# Patient Record
Sex: FEMALE | Race: OTHER | RURAL
[De-identification: names, ages, dates, MRNs, and addresses within clinical notes are randomized per-mention and may not be internally consistent; named-entity substitution may affect disease eponyms.]

---

## 2018-08-07 ENCOUNTER — TELEPHONE (OUTPATIENT)
Dept: FAMILY MEDICINE CLINIC | Age: 38
End: 2018-08-07

## 2018-08-07 NOTE — TELEPHONE ENCOUNTER
Pt is scheduled to come in on 8/13 for an appt with NP. She called wanting to know if NP has received her medical records.     [phone]

## 2018-08-08 NOTE — TELEPHONE ENCOUNTER
Left message for patient to call. We received her medical records today and Rolena  NP has them to review.

## 2018-08-13 ENCOUNTER — OFFICE VISIT (OUTPATIENT)
Dept: FAMILY MEDICINE CLINIC | Age: 38
End: 2018-08-13

## 2018-08-13 VITALS
HEIGHT: 69 IN | OXYGEN SATURATION: 95 % | WEIGHT: 151 LBS | HEART RATE: 84 BPM | RESPIRATION RATE: 16 BRPM | BODY MASS INDEX: 22.36 KG/M2 | DIASTOLIC BLOOD PRESSURE: 60 MMHG | SYSTOLIC BLOOD PRESSURE: 107 MMHG | TEMPERATURE: 98.2 F

## 2018-08-13 DIAGNOSIS — Z76.89 ENCOUNTER TO ESTABLISH CARE: ICD-10-CM

## 2018-08-13 DIAGNOSIS — K58.9 IRRITABLE BOWEL SYNDROME, UNSPECIFIED TYPE: Primary | ICD-10-CM

## 2018-08-13 DIAGNOSIS — L70.9 ACNE, UNSPECIFIED ACNE TYPE: ICD-10-CM

## 2018-08-13 RX ORDER — FLUOXETINE 10 MG/1
10 TABLET ORAL DAILY
Qty: 30 TAB | Refills: 3 | Status: SHIPPED | OUTPATIENT
Start: 2018-08-13 | End: 2019-01-03 | Stop reason: ALTCHOICE

## 2018-08-13 RX ORDER — DESOGESTREL AND ETHINYL ESTRADIOL 0.15-0.03
1 KIT ORAL DAILY
COMMUNITY
Start: 2018-08-13 | End: 2019-01-03 | Stop reason: SDUPTHER

## 2018-08-13 RX ORDER — SPIRONOLACTONE 50 MG/1
50 TABLET, FILM COATED ORAL 2 TIMES DAILY
COMMUNITY
Start: 2018-08-13 | End: 2019-05-02 | Stop reason: SDUPTHER

## 2018-08-13 RX ORDER — FLUOXETINE 10 MG/1
10 TABLET ORAL DAILY
COMMUNITY
Start: 2018-08-13 | End: 2018-08-13 | Stop reason: SDUPTHER

## 2018-08-13 NOTE — MR AVS SNAPSHOT
[address] [address] 
[phone] Patient: Rosa Cartwright MRN: [id number] SPA:[birth year]2 Visit Information Date & Time Provider Department Dept. Phone Encounter #  
 8/13/2018  1:00 PM Randall Loyd  [facility] [phone] [id number] Follow-up Instructions Return in about 4 months (around 12/13/2018) for Routine visit and labs. Upcoming Health Maintenance Date Due DTaP/Tdap/Td series (1 - Tdap) 8/16/2003 PAP AKA CERVICAL CYTOLOGY 8/26/2016 Influenza Age 5 to Adult 8/1/2018 Allergies as of 8/13/2018  Review Complete On: 8/13/2018 By: Randall Loyd NP No Known Allergies Current Immunizations  Never Reviewed No immunizations on file. Not reviewed this visit You Were Diagnosed With   
  
 Codes Comments Irritable bowel syndrome, unspecified type    -  Primary ICD-10-CM: K58.9 ICD-9-CM: 771.0 Vitals BP Pulse Temp Resp Height(growth percentile) Weight(growth percentile) 107/60 (BP 1 Location: Right arm, BP Patient Position: Sitting) 84 98.2 °F (36.8 °C) (Oral) 16 5' 8.5\" (1.74 m) 151 lb (68.5 kg) SpO2 BMI OB Status Smoking Status 95% 22.63 kg/m2 IUD Never Smoker Vitals History BMI and BSA Data Body Mass Index Body Surface Area  
 22.63 kg/m 2 1.82 m 2 Preferred Pharmacy Pharmacy Name Phone [address] [place], [place] - [address] [phone] Your Updated Medication List  
  
   
This list is accurate as of 8/13/18  1:42 PM.  Always use your most recent med list.  
  
  
  
  
 APRI 0.15-0.03 mg Tab Generic drug:  desogestrel-ethinyl estradiol Take 1 Tab by mouth daily. Diphth, Pertus(Acell), Tetanus 2.5-8-5 Lf-mcg-Lf/0.5mL Syrg Commonly known as:  BOOSTRIX TDAP  
0.5 mL by IntraMUSCular route once for 1 dose. FLUoxetine 10 mg tablet Commonly known as:  PROzac Take 1 Tab by mouth daily. spironolactone 50 mg tablet Commonly known as:  ALDACTONE Take 1 Tab by mouth two (2) times a day. Prescriptions Printed Refills Rina Martin,, Tetanus (BOOSTRIX TDAP) 2.5-8-5 Lf-mcg-Lf/0.5mL syrg 0 Si.5 mL by IntraMUSCular route once for 1 dose. Class: Print Route: IntraMUSCular Prescriptions Sent to Pharmacy Refills FLUoxetine (PROZAC) 10 mg tablet 3 Sig: Take 1 Tab by mouth daily. Class: Normal  
 Pharmacy: [address] [place] [address] #: [phone] Route: Oral  
  
Follow-up Instructions Return in about 4 months (around 2018) for Routine visit and labs. Patient Instructions Eating Healthy Foods: Care Instructions Your Care Instructions Eating healthy foods can help lower your risk for disease. Healthy food gives you energy and keeps your heart strong, your brain active, your muscles working, and your bones strong. A healthy diet includes a variety of foods from the basic food groups: grains, vegetables, fruits, milk and milk products, and meat and beans. Some people may eat more of their favorite foods from only one food group and, as a result, miss getting the nutrients they need. So, it is important to pay attention not only to what you eat but also to what you are missing from your diet. You can eat a healthy, balanced diet by making a few small changes. Follow-up care is a key part of your treatment and safety. Be sure to make and go to all appointments, and call your doctor if you are having problems. It's also a good idea to know your test results and keep a list of the medicines you take. How can you care for yourself at home? Look at what you eat · Keep a food diary for a week or two and record everything you eat or drink. Track the number of servings you eat from each food group. · For a balanced diet every day, eat a variety of: ¨ 6 or more ounce-equivalents of grains, such as cereals, breads, crackers, rice, or pasta, every day. An ounce-equivalent is 1 slice of bread, 1 cup of ready-to-eat cereal, or ½ cup of cooked rice, cooked pasta, or cooked cereal. 
¨ 2½ cups of vegetables, especially: § Dark-green vegetables such as broccoli and spinach. § Orange vegetables such as carrots and sweet potatoes. § Dry beans (such as mercer and kidney beans) and peas (such as lentils). ¨ 2 cups of fresh, frozen, or canned fruit. A small apple or 1 banana or orange equals 1 cup. ¨ 3 cups of nonfat or low-fat milk, yogurt, or other milk products. ¨ 5½ ounces of meat and beans, such as chicken, fish, lean meat, beans, nuts, and seeds. One egg, 1 tablespoon of peanut butter, ½ ounce nuts or seeds, or ¼ cup of cooked beans equals 1 ounce of meat. · Learn how to read food labels for serving sizes and ingredients. Fast-food and convenience-food meals often contain few or no fruits or vegetables. Make sure you eat some fruits and vegetables to make the meal more nutritious. · Look at your food diary. For each food group, add up what you have eaten and then divide the total by the number of days. This will give you an idea of how much you are eating from each food group. See if you can find some ways to change your diet to make it more healthy. Start small · Do not try to make dramatic changes to your diet all at once. You might feel that you are missing out on your favorite foods and then be more likely to fail. · Start slowly, and gradually change your habits. Try some of the following: ¨ Use whole wheat bread instead of white bread. ¨ Use nonfat or low-fat milk instead of whole milk. ¨ Eat brown rice instead of white rice, and eat whole wheat pasta instead of white-flour pasta. ¨ Try low-fat cheeses and low-fat yogurt. ¨ Add more fruits and vegetables to meals and have them for snacks. ¨ Add lettuce, tomato, cucumber, and onion to sandwiches. ¨ Add fruit to yogurt and cereal. 
Enjoy food · You can still eat your favorite foods. You just may need to eat less of them. If your favorite foods are high in fat, salt, and sugar, limit how often you eat them, but do not cut them out entirely. · Eat a wide variety of foods. Make healthy choices when eating out · The type of restaurant you choose can help you make healthy choices. Even fast-food chains are now offering more low-fat or healthier choices on the menu. · Choose smaller portions, or take half of your meal home. · When eating out, try: ¨ A veggie pizza with a whole wheat crust or grilled chicken (instead of sausage or pepperoni). ¨ Pasta with roasted vegetables, grilled chicken, or marinara sauce instead of cream sauce. ¨ A vegetable wrap or grilled chicken wrap. ¨ Broiled or poached food instead of fried or breaded items. Make healthy choices easy · Buy packaged, prewashed, ready-to-eat fresh vegetables and fruits, such as baby carrots, salad mixes, and chopped or shredded broccoli and cauliflower. · Buy packaged, presliced fruits, such as melon or pineapple. · Choose 100% fruit or vegetable juice instead of soda. Limit juice intake to 4 to 6 oz (½ to ¾ cup) a day. · Blend low-fat yogurt, fruit juice, and canned or frozen fruit to make a smoothie for breakfast or a snack. Where can you learn more? Go to http://theron-elizabeth.info/. Enter T756 in the search box to learn more about \"Eating Healthy Foods: Care Instructions. \" Current as of: May 12, 2017 Content Version: 11.7 © 6602-6391 [employer]. Care instructions adapted under license by [employer] (which disclaims liability or warranty for this information).  If you have questions about a medical condition or this instruction, always ask your healthcare professional. [address] any warranty or liability for your use of this information. Introducing \[facility]\"" & HEALTH SERVICES! Dear Jose Ramon: Thank you for requesting a Shoutfit account. Our records indicate that you already have an active Shoutfit account. You can access your account anytime at [email or web address]. "[email or web address]/[email or web address] Did you know that you can access your hospital and ER discharge instructions at any time in Shoutfit? You can also review all of your test results from your hospital stay or ER visit. Additional Information If you have questions, please visit the Frequently Asked Questions section of the Shoutfit website at [email or web address]. "[email or web address]/[email or web address]/. Remember, Shoutfit is NOT to be used for urgent needs. For medical emergencies, dial 911. Now available from your iPhone and Android! Please provide this summary of care documentation to your next provider. If you have any questions after today's visit, please call [phone].

## 2018-08-13 NOTE — PROGRESS NOTES
1. Have you been to the ER, urgent care clinic since your last visit? Hospitalized since your last visit? No    2. Have you seen or consulted any other health care providers outside of the [facility] since your last visit? Include any pap smears or colon screening.  No  Reviewed record in preparation for visit and have necessary documentation  Pt did not bring medication to office visit for review    Goals that were addressed and/or need to be completed during or after this appointment include   Health Maintenance Due   Topic Date Due    DTaP/Tdap/Td series (1 - Tdap) 08/16/2003    PAP AKA CERVICAL CYTOLOGY  08/26/2016    Influenza Age 9 to Adult  08/01/2018     Pt has had a PAP in 2017 will request records from GYN

## 2018-08-13 NOTE — PATIENT INSTRUCTIONS

## 2018-08-13 NOTE — PROGRESS NOTES
Gloria Hudson  30 y.o. female  1980  [address]  [address]  <[id number]>     [facility]: Progress Note       Encounter Date: 8/13/2018    No chief complaint on file. History of Present Illness   Gloria Hudson is a 30 y.o. female who presents to clinic today for:  Establish Care. Records rec'v from [facility] and scanned to [facility]. Awaiting gyn notes from Dr. Karuna Nunez. Dr. Karuna Nunez prescribes the Apri and aldactone. Patient with no CC at this time. Endorses eating a healthy diet; occasionally adds salt to foods & will eat fast foods depending on schedule. Hydration with water mainly, but also drinks tea. Patient states her IBS was causing anxiety and and social isolation. Used Bental and Linzess in the past with some relief. Patient states it would starts as constipation then change to diarrhea. She states the low dose Prozac is working. She is  with 3 children (14yo boy, 17yo boy, 10yo girl) . Denies domestic abuse. Works for the [employer]. Health Maintenance    Health Maintenance Due   Topic Date Due    DTaP/Tdap/Td series (1 - Tdap) 08/16/2003    PAP AKA CERVICAL CYTOLOGY  08/26/2016    Influenza Age 5 to Adult  08/01/2018     Review of Systems   Review of Systems   Constitutional: Negative. HENT: Negative. Eyes: Negative. Respiratory: Negative. Cardiovascular: Negative. Gastrointestinal: Negative. Genitourinary: Negative. Musculoskeletal: Negative. Skin: Negative. Neurological: Negative. Endo/Heme/Allergies: Negative. Psychiatric/Behavioral: Negative. Vitals/Objective:     Vitals:    08/13/18 1307   BP: 107/60   Pulse: 84   Resp: 16   Temp: 98.2 °F (36.8 °C)   TempSrc: Oral   SpO2: 95%   Weight: 151 lb (68.5 kg)   Height: 5' 8.5\" (1.74 m)     Body mass index is 22.63 kg/(m^2). Physical Exam   Constitutional: She is oriented to person, place, and time.  She appears well-developed and well-nourished. She is cooperative. HENT:   Head: Normocephalic. Nose: Nose normal.   Mouth/Throat: Uvula is midline, oropharynx is clear and moist and mucous membranes are normal.   Eyes: Conjunctivae and lids are normal. Pupils are equal, round, and reactive to light. Neck: Trachea normal, normal range of motion, full passive range of motion without pain and phonation normal. Neck supple. No thyroid mass and no thyromegaly present. Cardiovascular: Normal rate, regular rhythm, normal heart sounds and normal pulses. Pulmonary/Chest: Effort normal and breath sounds normal.   Abdominal: Soft. Normal appearance and bowel sounds are normal. There is no hepatosplenomegaly. There is no tenderness. There is no CVA tenderness. Musculoskeletal: Normal range of motion. Lymphadenopathy:     She has no cervical adenopathy. Neurological: She is alert and oriented to person, place, and time. She has normal strength. No cranial nerve deficit. She displays a negative Romberg sign. CN II-XII intact. Skin: Skin is warm, dry and intact. Psychiatric: She has a normal mood and affect. Her speech is normal and behavior is normal. Judgment and thought content normal. Cognition and memory are normal.       No results found for this or any previous visit (from the past 24 hour(s)). Assessment and Plan:   1. Irritable bowel syndrome, unspecified type    - FLUoxetine (PROZAC) 10 mg tablet; Take 1 Tab by mouth daily. Dispense: 30 Tab; Refill: 3    Discussed f/u in ~4 months for routine visit and labs. I have discussed the diagnosis with the patient and the intended plan as seen in the above orders. she has expressed understanding. The patient has received an after-visit summary and questions were answered concerning future plans. I have discussed medication side effects and warnings with the patient as well.     Electronically Signed: Marli Muller NP     History/Allergies   Patients past medical, surgical and family histories were reviewed and updated. Past Medical History:   Diagnosis Date    Acne       Past Surgical History:   Procedure Laterality Date    HX TUBAL LIGATION  08/01/2016     No family history on file. Social History     Social History    Marital status:      Spouse name: N/A    Number of children: N/A    Years of education: N/A     Occupational History    Not on file. Social History Main Topics    Smoking status: Never Smoker    Smokeless tobacco: Never Used    Alcohol use No    Drug use: No    Sexual activity: Yes     Partners: Male     Birth control/ protection: Injection, Surgical     Other Topics Concern    Not on file     Social History Narrative         No Known Allergies    Disposition     Follow-up Disposition:  Return in about 4 months (around 12/13/2018) for Routine visit and labs. No future appointments. Current Medications after this visit     Current Outpatient Prescriptions   Medication Sig    spironolactone (ALDACTONE) 50 mg tablet Take 1 Tab by mouth two (2) times a day.  desogestrel-ethinyl estradiol (APRI) 0.15-0.03 mg tab Take 1 Tab by mouth daily.  Diphth, Pertus,Acell,, Tetanus (BOOSTRIX TDAP) 2.5-8-5 Lf-mcg-Lf/0.5mL syrg 0.5 mL by IntraMUSCular route once for 1 dose.  FLUoxetine (PROZAC) 10 mg tablet Take 1 Tab by mouth daily. No current facility-administered medications for this visit.       Medications Discontinued During This Encounter   Medication Reason    betamethasone valerate (VALISONE) 0.1 % ointment Therapy Completed    FLUoxetine (PROZAC) 10 mg tablet Reorder

## 2018-12-20 ENCOUNTER — OFFICE VISIT (OUTPATIENT)
Dept: FAMILY MEDICINE CLINIC | Age: 38
End: 2018-12-20

## 2018-12-20 VITALS
HEART RATE: 72 BPM | TEMPERATURE: 97.5 F | BODY MASS INDEX: 22.22 KG/M2 | RESPIRATION RATE: 20 BRPM | DIASTOLIC BLOOD PRESSURE: 69 MMHG | OXYGEN SATURATION: 99 % | WEIGHT: 150 LBS | HEIGHT: 69 IN | SYSTOLIC BLOOD PRESSURE: 104 MMHG

## 2018-12-20 DIAGNOSIS — R10.9 ABDOMINAL PAIN, UNSPECIFIED ABDOMINAL LOCATION: Primary | ICD-10-CM

## 2018-12-20 DIAGNOSIS — K59.1 FUNCTIONAL DIARRHEA: ICD-10-CM

## 2018-12-20 DIAGNOSIS — R19.7 DIARRHEA, UNSPECIFIED TYPE: ICD-10-CM

## 2018-12-20 RX ORDER — DICYCLOMINE HYDROCHLORIDE 10 MG/1
40 CAPSULE ORAL 4 TIMES DAILY
Qty: 28 CAP | Refills: 0 | Status: SHIPPED | OUTPATIENT
Start: 2018-12-20 | End: 2019-01-03 | Stop reason: DRUGHIGH

## 2018-12-20 RX ORDER — DICYCLOMINE HYDROCHLORIDE 10 MG/1
20 CAPSULE ORAL 4 TIMES DAILY
Qty: 28 CAP | Refills: 0 | Status: SHIPPED | OUTPATIENT
Start: 2018-12-20 | End: 2019-01-03 | Stop reason: DRUGHIGH

## 2018-12-20 NOTE — PATIENT INSTRUCTIONS
Diet for Irritable Bowel Syndrome: Care Instructions  Your Care Instructions    Irritable bowel syndrome, or IBS, is a problem with the intestines. IBS can cause belly pain, bloating, gas, constipation, and diarrhea. Most people can control their symptoms by changing their diet and easing stress. No specific foods cause everyone with IBS to have symptoms. Doctors don't offer a specific diet to manage symptoms. But many people find that they feel better when they stop eating certain foods. A high-fiber diet may help if you have constipation. Follow-up care is a key part of your treatment and safety. Be sure to make and go to all appointments, and call your doctor if you are having problems. It's also a good idea to know your test results and keep a list of the medicines you take. How can you care for yourself at home? To reduce constipation  · Include fruits, vegetables, beans, and whole grains in your diet each day. These foods are high in fiber. Slowly increase the amount of fiber you eat. This helps you avoid a lot of gas. · Drink plenty of fluids, enough so that your urine is light yellow or clear like water. If you have kidney, heart, or liver disease and have to limit fluids, talk with your doctor before you increase the amount of fluids you drink. · Get some exercise every day. Build up slowly to 30 to 60 minutes a day on 5 or more days of the week. · Take a fiber supplement, such as Citrucel or Metamucil, every day if needed. Read and follow all instructions on the label. · Schedule time each day for a bowel movement. Having a daily routine may help. Take your time and do not strain when having a bowel movement. · Check with your doctor before you increase the amount of fiber in your diet. For some people who have IBS, eating more fiber may make some symptoms worse. This includes bloating. To reduce diarrhea  You may try giving up foods or drinks one at a time to see whether symptoms improve. Limit or avoid the following:  · Alcohol  · Caffeine, which is found in coffee, tea, cola drinks, and chocolate  · Nicotine, from smoking or chewing tobacco  · Gas-producing foods, such as beans, broccoli, cabbage, and apples  · Dairy products that contain lactose (milk sugar), such as ice cream, milk, cheese, and sour cream  · Foods and drinks high in sugar, especially fruit juice, soda, candy, and other packaged sweets (such as cookies)  · Foods high in fat, including garcia, sausage, butter, oils, and anything deep-fried  · Sorbitol and xylitol, artificial sweeteners found in some sugarless candies and chewing gum  Keep track of foods  · Some people with IBS use a daily food diary to keep track of what they eat and whether they have any symptoms after eating certain foods. The diary also can be a good way to record what is going on in your life. · Stress plays a role in IBS. So if you are aware that certain stresses bring on symptoms, you can try to reduce those stresses. Keep mealtimes pleasant  · Try to maintain a pleasant environment when you eat. This may reduce stress that can make symptoms likely to occur. · Give yourself plenty of time to eat, rather than eating on the go. Chew your food slowly. Try not to swallow air, which can cause bloating. Where can you learn more? Go to http://theron-elizabeth.info/. Enter D123 in the search box to learn more about \"Diet for Irritable Bowel Syndrome: Care Instructions. \"  Current as of: March 29, 2018  Content Version: 11.8  © 4491-8573 [employer]. Care instructions adapted under license by [employer] (which disclaims liability or warranty for this information). If you have questions about a medical condition or this instruction, always ask your healthcare professional. [address] any warranty or liability for your use of this information.

## 2018-12-20 NOTE — PROGRESS NOTES
Chief Complaint   Patient presents with    Abdominal Pain     Visit Vitals  /69 (BP 1 Location: Right arm, BP Patient Position: Sitting)   Pulse 72   Temp 97.5 °F (36.4 °C) (Oral)   Resp 20   Ht 5' 8.5\" (1.74 m)   Wt 150 lb (68 kg)   SpO2 99%   BMI 22.48 kg/m²     1. Have you been to the ER, urgent care clinic since your last visit? Hospitalized since your last visit? No    2. Have you seen or consulted any other health care providers outside of the [address] [place] [address] since your last visit? Include any pap smears or colon screening.  No    Reviewed record in preparation for visit and have necessary documentation  Pt did not bring medication to office visit for review  opportunity was given for questions  Goals that were addressed and/or need to be completed during or after this appointment include   Health Maintenance Due   Topic Date Due    DTaP/Tdap/Td series (1 - Tdap) 08/16/2003    Influenza Age 5 to Adult  08/01/2018     Patient refused flu shot

## 2018-12-20 NOTE — PROGRESS NOTES
Veda Lucio  41 y.o. female  1980  [address]   [address]  [id number]     [facility]: Progress Note       Encounter Date: 12/20/2018    Chief Complaint   Patient presents with    Abdominal Pain     History of Present Illness   Veda Lucio is a 41 y.o. female who presents to clinic today for:    Abdominal Pain-Feels like her symptoms are getting worse over the past month-diarrhea, dull abdominal pain (2/10) and (5/10 during a flare). She states she has always had bowel issues even as a child. She states that the flares are interfering with her work and social life. She states she is not eating when she knows she has work meetings for fear that she will have to go to the bathroom. She was in route to [place] with her  and had to turn around and go home due to panicking about having diarrhea. She states she plans outings based on bathroom availability. Pervious provider suggested a SSRI Prozac 10mg for ? IBS. She does not think she had any testing for her abdominal symptoms. She states she has been on Prozac ~1 year. She is unsure if the Prozac really work or if it \"was all in my head\". Denies-constipation (in the past had constipation and only stooling every couple of weeks), mucus, blood, early satiety, GERD, N, V, chest pain, incontinence of stool or bladder, nocturnal abdominal pain, weight loss, anemia or feeling fatigue. She does report that she was told that she had DJD in her lower back and when she has a flare her back will hurt more. Yesterday she states she has water, peppermint tea and crackers because she knew she had a meeting at work; no BM while at work; when she got home she tolerated a grilled cheese and soup. She states she will use a heating pad on her abdomen at night. She denies travel out of the country and no new food. She states she has a limited variety of foods. Denies trauma; tubal ligation 2016.  She takes her BCP everyday in order to not have a menstrual cycle. Health Maintenance    Health Maintenance Due   Topic Date Due    DTaP/Tdap/Td series (1 - Tdap) 08/16/2003     Review of Systems   Review of Systems   Constitutional: Negative. HENT: Negative. Eyes: Negative. Respiratory: Negative. Cardiovascular: Negative. Gastrointestinal: Positive for abdominal pain and diarrhea. Genitourinary: Negative. Musculoskeletal: Negative. Skin: Negative. Neurological: Negative. Endo/Heme/Allergies: Negative. Psychiatric/Behavioral: Negative. Vitals/Objective:     Vitals:    12/20/18 1337   BP: 104/69   Pulse: 72   Resp: 20   Temp: 97.5 °F (36.4 °C)   TempSrc: Oral   SpO2: 99%   Weight: 150 lb (68 kg)   Height: 5' 8.5\" (1.74 m)     Body mass index is 22.48 kg/m². Physical Exam   Constitutional: She is oriented to person, place, and time. She is cooperative. Eyes: Conjunctivae and lids are normal.   Cardiovascular: Normal rate, regular rhythm, normal heart sounds and normal pulses. Pulmonary/Chest: Effort normal and breath sounds normal.   Abdominal: Soft. Normal appearance and bowel sounds are normal. She exhibits no mass. There is no hepatosplenomegaly. There is no tenderness. There is no rigidity and no guarding. Musculoskeletal: Normal range of motion. Neurological: She is alert and oriented to person, place, and time. Skin: Skin is warm, dry and intact. Psychiatric: She has a normal mood and affect. Her speech is normal and behavior is normal. Judgment and thought content normal. Cognition and memory are normal.   Denies stressors with home or work. No results found for this or any previous visit (from the past 24 hour(s)). Assessment and Plan:   1. Abdominal pain, unspecified abdominal location    - CBC WITH AUTOMATED DIFF  - METABOLIC PANEL, COMPREHENSIVE  - C REACTIVE PROTEIN, QT  - TISSUE TRANSGLUT. AB, IGG  - ALPHA-GAL FOOD PANEL  - dicyclomine (BENTYL) 10 mg capsule;  Take 2 Caps by mouth four (4) times daily. Dispense: 28 Cap; Refill: 0  - dicyclomine (BENTYL) 10 mg capsule; Take 4 Caps by mouth four (4) times daily. Dispense: 28 Cap; Refill: 0    2. Functional diarrhea    - CBC WITH AUTOMATED DIFF  - METABOLIC PANEL, COMPREHENSIVE  - C REACTIVE PROTEIN, QT  - TISSUE TRANSGLUT. AB, IGG  - ALPHA-GAL FOOD PANEL    3. Diarrhea, unspecified type    - dicyclomine (BENTYL) 10 mg capsule; Take 2 Caps by mouth four (4) times daily. Dispense: 28 Cap; Refill: 0  - dicyclomine (BENTYL) 10 mg capsule; Take 4 Caps by mouth four (4) times daily. Dispense: 28 Cap; Refill: 0    Awaiting labs to r/o infection, celiac, alpha gal and anemia-will prescribe bentyl 20mg 4 times a day for 7 days then increase dose to 40mg 4 times a day. If not better discussed GI referral. Will f/u with patient in 2 weeks. Discussed Prozac taper over the next 30 days. One every other day for a week, every 2 days, every 3 days and then d/c. Discussed s/s of serotonin syndrome and the importance of tapering off the medication. I have discussed the diagnosis with the patient and the intended plan as seen in the above orders. she has expressed understanding. The patient has received an after-visit summary and questions were answered concerning future plans. I have discussed medication side effects and warnings with the patient as well. Electronically Signed: Onel Givens NP     History/Allergies   Patients past medical, surgical and family histories were reviewed and updated. Past Medical History:   Diagnosis Date    Acne       Past Surgical History:   Procedure Laterality Date    HX TUBAL LIGATION  08/01/2016     No family history on file.   Social History     Socioeconomic History    Marital status:      Spouse name: Not on file    Number of children: Not on file    Years of education: Not on file    Highest education level: Not on file   Social Needs    Financial resource strain: Not on file    Food insecurity - worry: Not on file    Food insecurity - inability: Not on file    Transportation needs - medical: Not on file   [facility] needs - non-medical: Not on file   Occupational History    Not on file   Tobacco Use    Smoking status: Never Smoker    Smokeless tobacco: Never Used   Substance and Sexual Activity    Alcohol use: No    Drug use: No    Sexual activity: Yes     Partners: Male     Birth control/protection: Injection, Surgical   Other Topics Concern    Not on file   Social History Narrative    Not on file         No Known Allergies    Disposition     Follow-up Disposition:  Return in about 2 weeks (around 1/3/2019), or if symptoms worsen or fail to improve, for medication f/u. Future Appointments   Date Time Provider Carlos Lindo   1/3/2019  2:10 PM Raul Shafer, [facility] SCHED            Current Medications after this visit     Current Outpatient Medications   Medication Sig    dicyclomine (BENTYL) 10 mg capsule Take 2 Caps by mouth four (4) times daily.  dicyclomine (BENTYL) 10 mg capsule Take 4 Caps by mouth four (4) times daily.  spironolactone (ALDACTONE) 50 mg tablet Take 1 Tab by mouth two (2) times a day.  desogestrel-ethinyl estradiol (APRI) 0.15-0.03 mg tab Take 1 Tab by mouth daily.  FLUoxetine (PROZAC) 10 mg tablet Take 1 Tab by mouth daily. No current facility-administered medications for this visit. There are no discontinued medications.

## 2018-12-21 ENCOUNTER — TELEPHONE (OUTPATIENT)
Dept: FAMILY MEDICINE CLINIC | Age: 38
End: 2018-12-21

## 2018-12-21 NOTE — TELEPHONE ENCOUNTER
Received call from patient, informed per NP:  Please inform patient that her CBC (no infection or aneima), CMP (kidney, liver and electrolytes) and CRP (no infection or inflammation) are normal. Awaiting her labs on the meat allergy and celiac to result.      I hope the bentyl is working. Patient verbalized understanding.

## 2018-12-21 NOTE — TELEPHONE ENCOUNTER
Please inform patient that her CBC (no infection or aneima), CMP (kidney, liver and electrolytes) and CRP (no infection or inflammation) are normal. Awaiting her labs on the meat allergy and celiac to result. I hope the bentyl is working. Thank you!

## 2018-12-26 LAB
ALBUMIN SERPL-MCNC: 4.5 G/DL (ref 3.5–5.5)
ALBUMIN/GLOB SERPL: 1.5 {RATIO} (ref 1.2–2.2)
ALP SERPL-CCNC: 61 IU/L (ref 39–117)
ALPHA-GAL IGE QN: <0.1 KU/L
ALT SERPL-CCNC: 10 IU/L (ref 0–32)
AST SERPL-CCNC: 16 IU/L (ref 0–40)
BASOPHILS # BLD AUTO: 0 X10E3/UL (ref 0–0.2)
BASOPHILS NFR BLD AUTO: 0 %
BEEF IGE QN: <0.1 KU/L
BILIRUB SERPL-MCNC: 0.4 MG/DL (ref 0–1.2)
BUN SERPL-MCNC: 11 MG/DL (ref 6–20)
BUN/CREAT SERPL: 14 (ref 9–23)
CALCIUM SERPL-MCNC: 10 MG/DL (ref 8.7–10.2)
CHLORIDE SERPL-SCNC: 101 MMOL/L (ref 96–106)
CO2 SERPL-SCNC: 23 MMOL/L (ref 20–29)
CREAT SERPL-MCNC: 0.81 MG/DL (ref 0.57–1)
CRP SERPL-MCNC: 1.3 MG/L (ref 0–4.9)
DEPRECATED BEEF IGE RAST QL: 0
DEPRECATED LAMB IGE RAST QL: 0
DEPRECATED PORK IGE RAST QL: 0
EOSINOPHIL # BLD AUTO: 0.2 X10E3/UL (ref 0–0.4)
EOSINOPHIL NFR BLD AUTO: 4 %
ERYTHROCYTE [DISTWIDTH] IN BLOOD BY AUTOMATED COUNT: 13.3 % (ref 12.3–15.4)
GLOBULIN SER CALC-MCNC: 3 G/DL (ref 1.5–4.5)
GLUCOSE SERPL-MCNC: 74 MG/DL (ref 65–99)
HCT VFR BLD AUTO: 37.3 % (ref 34–46.6)
HGB BLD-MCNC: 12.6 G/DL (ref 11.1–15.9)
IMM GRANULOCYTES # BLD: 0 X10E3/UL (ref 0–0.1)
IMM GRANULOCYTES NFR BLD: 0 %
LAMB IGE QN: <0.1 KU/L
LYMPHOCYTES # BLD AUTO: 2.5 X10E3/UL (ref 0.7–3.1)
LYMPHOCYTES NFR BLD AUTO: 41 %
MCH RBC QN AUTO: 31.2 PG (ref 26.6–33)
MCHC RBC AUTO-ENTMCNC: 33.8 G/DL (ref 31.5–35.7)
MCV RBC AUTO: 92 FL (ref 79–97)
MONOCYTES # BLD AUTO: 0.3 X10E3/UL (ref 0.1–0.9)
MONOCYTES NFR BLD AUTO: 5 %
NEUTROPHILS # BLD AUTO: 3.1 X10E3/UL (ref 1.4–7)
NEUTROPHILS NFR BLD AUTO: 50 %
PLATELET # BLD AUTO: 234 X10E3/UL (ref 150–379)
PORK IGE QN: <0.1 KU/L
POTASSIUM SERPL-SCNC: 4 MMOL/L (ref 3.5–5.2)
PROT SERPL-MCNC: 7.5 G/DL (ref 6–8.5)
RBC # BLD AUTO: 4.04 X10E6/UL (ref 3.77–5.28)
SODIUM SERPL-SCNC: 140 MMOL/L (ref 134–144)
TTG IGG SER-ACNC: <2 U/ML (ref 0–5)
WBC # BLD AUTO: 6.2 X10E3/UL (ref 3.4–10.8)

## 2018-12-27 ENCOUNTER — TELEPHONE (OUTPATIENT)
Dept: FAMILY MEDICINE CLINIC | Age: 38
End: 2018-12-27

## 2018-12-27 NOTE — TELEPHONE ENCOUNTER
Spoke with patient and verified identifiers. Patient is on my chart and is able to review her labs. Discussed briefly lab values; will discuss in more detail at f/u appointment. Patient states she is feeling better with the bentyl and no reported side effects.

## 2019-01-03 ENCOUNTER — OFFICE VISIT (OUTPATIENT)
Dept: FAMILY MEDICINE CLINIC | Age: 39
End: 2019-01-03

## 2019-01-03 VITALS
RESPIRATION RATE: 18 BRPM | WEIGHT: 153.8 LBS | OXYGEN SATURATION: 98 % | TEMPERATURE: 97.5 F | SYSTOLIC BLOOD PRESSURE: 104 MMHG | HEART RATE: 63 BPM | DIASTOLIC BLOOD PRESSURE: 66 MMHG | HEIGHT: 69 IN | BODY MASS INDEX: 22.78 KG/M2

## 2019-01-03 DIAGNOSIS — K58.2 IRRITABLE BOWEL SYNDROME WITH BOTH CONSTIPATION AND DIARRHEA: Primary | ICD-10-CM

## 2019-01-03 DIAGNOSIS — R19.7 DIARRHEA, UNSPECIFIED TYPE: ICD-10-CM

## 2019-01-03 DIAGNOSIS — Z79.899 MEDICATION MANAGEMENT: ICD-10-CM

## 2019-01-03 DIAGNOSIS — R10.9 ABDOMINAL PAIN, UNSPECIFIED ABDOMINAL LOCATION: ICD-10-CM

## 2019-01-03 RX ORDER — DICYCLOMINE HYDROCHLORIDE 20 MG/1
40 TABLET ORAL EVERY 6 HOURS
Qty: 240 TAB | Refills: 3 | Status: SHIPPED | OUTPATIENT
Start: 2019-01-03 | End: 2019-08-07 | Stop reason: SDUPTHER

## 2019-01-03 RX ORDER — DESOGESTREL AND ETHINYL ESTRADIOL 0.15-0.03
1 KIT ORAL DAILY
Qty: 1 DOSE PACK | Refills: 11 | Status: SHIPPED | OUTPATIENT
Start: 2019-01-03 | End: 2019-05-02 | Stop reason: SDUPTHER

## 2019-01-03 NOTE — PROGRESS NOTES
Chief Complaint   Patient presents with    Medication Refill     Visit Vitals  /66 (BP 1 Location: Right arm, BP Patient Position: Sitting)   Pulse 63   Temp 97.5 °F (36.4 °C) (Oral)   Resp 18   Ht 5' 8.5\" (1.74 m)   Wt 153 lb 12.8 oz (69.8 kg)   SpO2 98%   BMI 23.05 kg/m²     1. Have you been to the ER, urgent care clinic since your last visit? Hospitalized since your last visit? No    2. Have you seen or consulted any other health care providers outside of the [address] [place] [address] since your last visit? Include any pap smears or colon screening.  No    Reviewed record in preparation for visit and have necessary documentation  Pt did not bring medication to office visit for review  opportunity was given for questions  Goals that were addressed and/or need to be completed during or after this appointment include   Health Maintenance Due   Topic Date Due    DTaP/Tdap/Td series (1 - Tdap) 08/16/2003

## 2019-01-03 NOTE — PROGRESS NOTES
Dane Ferreira  41 y.o. female  1980  [address]  [id number]     [facility] Practice: Progress Note       Encounter Date: 1/3/2019    Chief Complaint   Patient presents with    Medication Refill     History of Present Illness   Dane Ferreira is a 41 y.o. female who presents to clinic today for:    Medication evaluation-Liliana reports great outcomes with the Bentyl. She reports 1 flare last night after eating jambalaya. She states since starting the bentyl her anxiety has greatly decreased. She was able to enjoy the holidays and go shopping etc without worrying about her IBS flaring. She continues to monitor her food choices. She has completely weaned off the prozac without any complaints. Continues the Apri BC pill and aldactone for adult acne. Health Maintenance    Health Maintenance Due   Topic Date Due    DTaP/Tdap/Td series (1 - Tdap) 08/16/2003     Review of Systems   Review of Systems   Constitutional: Negative. HENT: Negative. Eyes: Negative. Respiratory: Negative. Cardiovascular: Negative. Gastrointestinal: Negative. Genitourinary: Negative. Musculoskeletal: Negative. Skin: Negative. Neurological: Negative. Endo/Heme/Allergies: Negative. Psychiatric/Behavioral: Negative. Vitals/Objective:     Vitals:    01/03/19 1345   BP: 104/66   Pulse: 63   Resp: 18   Temp: 97.5 °F (36.4 °C)   TempSrc: Oral   SpO2: 98%   Weight: 153 lb 12.8 oz (69.8 kg)   Height: 5' 8.5\" (1.74 m)     Body mass index is 23.05 kg/m². Physical Exam   Constitutional: She is oriented to person, place, and time. She is active and cooperative. HENT:   Head: Normocephalic. Nose: Nose normal.   Mouth/Throat: Uvula is midline, oropharynx is clear and moist and mucous membranes are normal.   Eyes: Conjunctivae and lids are normal. Pupils are equal, round, and reactive to light.    Neck: Trachea normal, normal range of motion, full passive range of motion without pain and phonation normal. Neck supple. Cardiovascular: Normal rate, regular rhythm, normal heart sounds and normal pulses. Pulmonary/Chest: Effort normal and breath sounds normal.   Abdominal: Soft. Bowel sounds are normal. There is no tenderness. Musculoskeletal: Normal range of motion. Lymphadenopathy:     She has no cervical adenopathy. Neurological: She is alert and oriented to person, place, and time. Skin: Skin is warm, dry and intact. Psychiatric: She has a normal mood and affect. Her speech is normal and behavior is normal. Judgment and thought content normal. Cognition and memory are normal.       No results found for this or any previous visit (from the past 24 hour(s)). Assessment and Plan:   1. Abdominal pain, unspecified abdominal location    2. Irritable bowel syndrome with both constipation and diarrhea    3. Medication management    Will continue using Bentyl. Advised that we can always consider a referral to GI if Bentyl doesn't provide relief or flares increase or symptoms get worse. Refills for bentyl and Apri. Discussed new pap smear guidelines 11/2017 pap results scanned to [facility]. Unless there are any issues will repeat pap 2020. I have discussed the diagnosis with the patient and the intended plan as seen in the above orders. she has expressed understanding. The patient has received an after-visit summary and questions were answered concerning future plans. I have discussed medication side effects and warnings with the patient as well. Electronically Signed: Ruby Welch NP     History/Allergies   Patients past medical, surgical and family histories were reviewed and updated. Past Medical History:   Diagnosis Date    Acne     IBS (irritable bowel syndrome)       Past Surgical History:   Procedure Laterality Date    HX TUBAL LIGATION  08/01/2016     No family history on file.   Social History     Socioeconomic History    Marital status:      Spouse name: Not on file    Number of children: Not on file    Years of education: Not on file    Highest education level: Not on file   Social Needs    Financial resource strain: Not on file    Food insecurity - worry: Not on file    Food insecurity - inability: Not on file    Transportation needs - medical: Not on file   [employer] needs - non-medical: Not on file   Occupational History    Not on file   Tobacco Use    Smoking status: Never Smoker    Smokeless tobacco: Never Used   Substance and Sexual Activity    Alcohol use: No    Drug use: No    Sexual activity: Yes     Partners: Male     Birth control/protection: Surgical, Pill   Other Topics Concern    Not on file   Social History Narrative    Not on file         No Known Allergies    Disposition     Follow-up Disposition:  Return if symptoms worsen or fail to improve. No future appointments. Current Medications after this visit     Current Outpatient Medications   Medication Sig    dicyclomine (BENTYL) 20 mg tablet Take 2 Tabs by mouth every six (6) hours.  desogestrel-ethinyl estradiol (APRI) 0.15-0.03 mg tab Take 1 Tab by mouth daily.  spironolactone (ALDACTONE) 50 mg tablet Take 1 Tab by mouth two (2) times a day. No current facility-administered medications for this visit.       Medications Discontinued During This Encounter   Medication Reason    FLUoxetine (PROZAC) 10 mg tablet Therapy Completed    dicyclomine (BENTYL) 10 mg capsule Dose Adjustment    dicyclomine (BENTYL) 10 mg capsule Dose Adjustment    desogestrel-ethinyl estradiol (APRI) 0.15-0.03 mg tab Reorder

## 2019-05-02 RX ORDER — SPIRONOLACTONE 50 MG/1
50 TABLET, FILM COATED ORAL 2 TIMES DAILY
Qty: 180 TAB | Refills: 1 | Status: SHIPPED | OUTPATIENT
Start: 2019-05-02 | End: 2019-07-19 | Stop reason: SDUPTHER

## 2019-05-02 RX ORDER — DESOGESTREL AND ETHINYL ESTRADIOL 0.15-0.03
1 KIT ORAL DAILY
Qty: 3 DOSE PACK | Refills: 3 | Status: SHIPPED | OUTPATIENT
Start: 2019-05-02 | End: 2020-05-29

## 2019-05-02 NOTE — TELEPHONE ENCOUNTER
For insurance purposes if the APRI could be written 90 day with 3 refills since the Pt takes this medication everyday.  Thanks

## 2019-08-07 RX ORDER — DICYCLOMINE HYDROCHLORIDE 20 MG/1
TABLET ORAL
Qty: 240 TAB | Refills: 0 | Status: SHIPPED | OUTPATIENT
Start: 2019-08-07 | End: 2020-07-23 | Stop reason: SDUPTHER

## 2019-09-26 ENCOUNTER — OFFICE VISIT (OUTPATIENT)
Dept: FAMILY MEDICINE CLINIC | Age: 39
End: 2019-09-26

## 2019-09-26 VITALS
DIASTOLIC BLOOD PRESSURE: 70 MMHG | OXYGEN SATURATION: 99 % | RESPIRATION RATE: 18 BRPM | TEMPERATURE: 97.3 F | HEART RATE: 79 BPM | WEIGHT: 147.6 LBS | SYSTOLIC BLOOD PRESSURE: 103 MMHG | BODY MASS INDEX: 21.86 KG/M2 | HEIGHT: 69 IN

## 2019-09-26 DIAGNOSIS — R10.9 ABDOMINAL PAIN, UNSPECIFIED ABDOMINAL LOCATION: Primary | ICD-10-CM

## 2019-09-26 DIAGNOSIS — K58.9 IRRITABLE BOWEL SYNDROME, UNSPECIFIED TYPE: ICD-10-CM

## 2019-09-26 DIAGNOSIS — F41.8 ANXIETY ABOUT HEALTH: ICD-10-CM

## 2019-09-26 RX ORDER — BUSPIRONE HYDROCHLORIDE 10 MG/1
10 TABLET ORAL
Qty: 90 TAB | Refills: 0 | Status: SHIPPED | OUTPATIENT
Start: 2019-09-26 | End: 2020-07-23 | Stop reason: SDUPTHER

## 2019-09-26 NOTE — PROGRESS NOTES
Chief Complaint   Patient presents with    Abdominal Pain    Anxiety     Visit Vitals  /70 (BP 1 Location: Right arm, BP Patient Position: Sitting)   Pulse 79   Temp 97.3 °F (36.3 °C) (Oral)   Resp 18   Ht 5' 8.5\" (1.74 m)   Wt 147 lb 9.6 oz (67 kg)   SpO2 99%   BMI 22.12 kg/m²     1. Have you been to the ER, urgent care clinic since your last visit? Hospitalized since your last visit? No    2. Have you seen or consulted any other health care providers outside of the [address] [place] [address] since your last visit? Include any pap smears or colon screening.  No    Reviewed record in preparation for visit and have necessary documentation  Pt did not bring medication to office visit for review  opportunity was given for questions  Goals that were addressed and/or need to be completed during or after this appointment include   Health Maintenance Due   Topic Date Due    DTaP/Tdap/Td series (1 - Tdap) 08/16/2003    Influenza Age 5 to Adult  08/01/2019

## 2019-09-26 NOTE — PROGRESS NOTES
Nash Guthrie  42 y.o. female  1980  [address] [id number]  [id number]     [facility] Practice: Progress Note       Encounter Date: 9/26/2019    Chief Complaint   Patient presents with    Abdominal Pain    Anxiety     History of Present Illness   Nash Guthrie is a 42 y.o. female who presents to clinic today for:    Abdominal pain- states the bentyl has helped with the abdominal pain and cramping. States now she will have constipation and no BM for 1 week then will have diarrhea. States once she does have a BM she is \"drained\". Reports taking miralax; states when she uses the miralax she will have hard and small stools. States when she started the bentyl she was able to attend meetings and function. States she will snack during the day and will have a meal when she gets home. Denies-blood in stool and urine. States some mucus now noted in stool. Has tried linzess and low dose SSRI in the past and did not notice any changes. Has never had a colonoscopy. States over the years her PCP as a child dx her as having an ulcer and in her 25s dx with IBS and tried different treatments and just learned to live with the symptoms. Noted ~6lb weight loss in 9 months. Anxiety- states now she doesn't want to leave home due to panic and concerns of having to go to the bathroom. Noted worsening anxiety for the past month. States she is having panic attacks while in work meetings and in [employer]. Symptoms-increased heart rate, \"fight or flight\", cant breath, & sweating. States she is focused on her bowels and a bathroom. States she feels panic even having a conversation with her family about going places. Having panic when in large crowds. Health Maintenance    Health Maintenance Due   Topic Date Due    DTaP/Tdap/Td series (1 - Tdap) 08/16/2003    Influenza Age 5 to Adult  08/01/2019     Review of Systems   Review of Systems   Constitutional: Negative. HENT: Negative.     Eyes: Negative. Respiratory: Negative. Cardiovascular: Negative. Gastrointestinal: Positive for abdominal pain, constipation and diarrhea. Genitourinary: Negative. Musculoskeletal: Negative. Skin: Negative. Neurological: Negative. Endo/Heme/Allergies: Negative. Psychiatric/Behavioral: The patient is nervous/anxious. Vitals/Objective:     Vitals:    09/26/19 1056   BP: 103/70   Pulse: 79   Resp: 18   Temp: 97.3 °F (36.3 °C)   TempSrc: Oral   SpO2: 99%   Weight: 147 lb 9.6 oz (67 kg)   Height: 5' 8.5\" (1.74 m)     Body mass index is 22.12 kg/m². Physical Exam   Constitutional: She is oriented to person, place, and time. She appears well-developed and well-nourished. Eyes: Conjunctivae are normal.   Pulmonary/Chest: Effort normal.   Musculoskeletal: Normal range of motion. Neurological: She is alert and oriented to person, place, and time. Psychiatric: Her speech is normal and behavior is normal. Judgment and thought content normal. Her mood appears anxious. Cognition and memory are normal.   Tearful and crying during encounter. No results found for this or any previous visit (from the past 24 hour(s)). Assessment and Plan:   1. Abdominal pain, unspecified abdominal location    - REFERRAL TO GASTROENTEROLOGY    2. Irritable bowel syndrome, unspecified type    - REFERRAL TO GASTROENTEROLOGY  - busPIRone (BUSPAR) 10 mg tablet; Take 1 Tab by mouth three (3) times daily as needed (anxiety). Indications: Repeated Episodes of Anxiety  Dispense: 90 Tab; Refill: 0    3. Anxiety about health    Referral to GI for evaluation and recommendations. Discussed continuing the bentyl until GI appointment. Discussed buspar in detail for anxiety. F/u as needed. I have discussed the diagnosis with the patient and the intended plan as seen in the above orders. she has expressed understanding.   The patient has received an after-visit summary and questions were answered concerning future plans.  I have discussed medication side effects and warnings with the patient as well. Electronically Signed: Maryanne Benton NP     History/Allergies   Patients past medical, surgical and family histories were reviewed and updated. Past Medical History:   Diagnosis Date    Acne     IBS (irritable bowel syndrome)       Past Surgical History:   Procedure Laterality Date    HX TUBAL LIGATION  08/01/2016     No family history on file.   Social History     Socioeconomic History    Marital status:      Spouse name: Not on file    Number of children: Not on file    Years of education: Not on file    Highest education level: Not on file   Occupational History    Not on file   Social Needs    Financial resource strain: Not on file    Food insecurity:     Worry: Not on file     Inability: Not on file    Transportation needs:     Medical: Not on file     Non-medical: Not on file   Tobacco Use    Smoking status: Never Smoker    Smokeless tobacco: Never Used   Substance and Sexual Activity    Alcohol use: No    Drug use: No    Sexual activity: Yes     Partners: Male     Birth control/protection: Surgical, Pill   Lifestyle    Physical activity:     Days per week: Not on file     Minutes per session: Not on file    Stress: Not on file   Relationships    Social connections:     Talks on phone: Not on file     Gets together: Not on file     Attends Congregation service: Not on file     Active member of club or organization: Not on file     Attends meetings of clubs or organizations: Not on file     Relationship status: Not on file    Intimate partner violence:     Fear of current or ex partner: Not on file     Emotionally abused: Not on file     Physically abused: Not on file     Forced sexual activity: Not on file   Other Topics Concern    Not on file   Social History Narrative    Not on file         No Known Allergies    Disposition     Follow-up and Dispositions  ·   Return if symptoms worsen or fail to improve. No future appointments. Current Medications after this visit     Current Outpatient Medications   Medication Sig    busPIRone (BUSPAR) 10 mg tablet Take 1 Tab by mouth three (3) times daily as needed (anxiety). Indications: Repeated Episodes of Anxiety    dicyclomine (BENTYL) 20 mg tablet TAKE TWO TABLETS BY MOUTH EVERY 6 HOURS    spironolactone (ALDACTONE) 50 mg tablet TAKE ONE TABLET BY MOUTH TWICE DAILY    desogestrel-ethinyl estradiol (APRI) 0.15-0.03 mg tab Take 1 Tab by mouth daily. Indications: Acne     No current facility-administered medications for this visit. There are no discontinued medications.

## 2019-09-26 NOTE — PATIENT INSTRUCTIONS
Irritable Bowel Syndrome: Care Instructions  Your Care Instructions  Irritable bowel syndrome, or IBS, is a problem with the intestines that causes belly pain, bloating, gas, constipation, and diarrhea. The cause of IBS is not well known. IBS can last for many years, but it does not get worse over time or lead to serious disease. Most people can control their symptoms by changing their diet and reducing stress. Follow-up care is a key part of your treatment and safety. Be sure to make and go to all appointments, and call your doctor if you are having problems. It's also a good idea to know your test results and keep a list of the medicines you take. How can you care for yourself at home? · For constipation:  ? Include fruits, vegetables, beans, and whole grains in your diet each day. These foods are high in fiber. ? Drink plenty of fluids. If you have kidney, heart, or liver disease and have to limit fluids, talk with your doctor before you increase the amount of fluids you drink. ? Take a fiber supplement, such as Citrucel or Metamucil, every day if needed. Read and follow all instructions on the label. ? Schedule time each day for a bowel movement. Having a daily routine may help. Take your time and do not strain when having a bowel movement. · If you often have diarrhea, limit foods and drinks that make it worse. These are different for each person but may include caffeine (found in coffee, tea, chocolate, and cola drinks), alcohol, fatty foods, gas-producing foods (such as beans, cabbage, and broccoli), some dairy products, and spicy foods. Do not eat candy or gum that contains sorbitol. · Keep a daily diary of what you eat and what symptoms you have. This may help find foods that cause you problems. · Eat slowly. Try to make mealtime relaxing. · Find ways to reduce stress. · Get at least 30 minutes of exercise on most days of the week. Exercise can help reduce tension and prevent constipation. Walking is a good choice. You also may want to do other activities, such as running, swimming, cycling, or playing tennis or team sports. When should you call for help? Call your doctor now or seek immediate medical care if:    · Your pain is different than usual or occurs with fever.     · You lose weight without trying, or you lose your appetite and you do not know why.     · Your symptoms often wake you from sleep.     · Your stools are black and tarlike or have streaks of blood.    Watch closely for changes in your health, and be sure to contact your doctor if:    · Your IBS symptoms get worse or begin to disrupt your day-to-day life.     · You become more tired than usual.     · Your home treatment stops working. Where can you learn more? Go to http://theron-elizabeth.info/. Enter S128 in the search box to learn more about \"Irritable Bowel Syndrome: Care Instructions. \"  Current as of: November 7, 2018  Content Version: 12.2  © 3917-0387 [employer]. Care instructions adapted under license by [employer] (which disclaims liability or warranty for this information). If you have questions about a medical condition or this instruction, always ask your healthcare professional. [address] any warranty or liability for your use of this information.

## 2020-05-29 RX ORDER — DESOGESTREL AND ETHINYL ESTRADIOL 0.15-0.03
KIT ORAL
Qty: 1 PACKAGE | Refills: 0 | Status: SHIPPED | OUTPATIENT
Start: 2020-05-29 | End: 2020-07-23 | Stop reason: SDUPTHER

## 2020-06-02 RX ORDER — DESOGESTREL AND ETHINYL ESTRADIOL 0.15-0.03
KIT ORAL
Qty: 1 PACKAGE | Refills: 0 | OUTPATIENT
Start: 2020-06-02

## 2020-07-21 RX ORDER — DESOGESTREL AND ETHINYL ESTRADIOL 0.15-0.03
KIT ORAL
Refills: 0 | OUTPATIENT
Start: 2020-07-21

## 2020-07-21 RX ORDER — SPIRONOLACTONE 50 MG/1
TABLET, FILM COATED ORAL
Qty: 60 TAB | Refills: 0 | OUTPATIENT
Start: 2020-07-21

## 2020-07-21 NOTE — TELEPHONE ENCOUNTER
Pt states she was denied her medication. She is not due a PAP for al teast 2 years. Do not understand why she was denied her medication. Please call Pt.

## 2020-07-22 NOTE — TELEPHONE ENCOUNTER
Patient called per Dr. Russell Jefferson:  tell Mrs. Nelson that We have not seen her in the office in almost 10 months, and that I have never actually seen her before.  For further refills I need to have an appointment, virtual will work, so I can discuss her medications. Patient unavailable, detailed message left.

## 2020-07-23 ENCOUNTER — VIRTUAL VISIT (OUTPATIENT)
Dept: FAMILY MEDICINE CLINIC | Age: 40
End: 2020-07-23

## 2020-07-23 DIAGNOSIS — F41.9 ANXIETY: ICD-10-CM

## 2020-07-23 DIAGNOSIS — E34.9 HORMONAL DISORDER: Primary | ICD-10-CM

## 2020-07-23 DIAGNOSIS — K58.9 IRRITABLE BOWEL SYNDROME, UNSPECIFIED TYPE: ICD-10-CM

## 2020-07-23 RX ORDER — DESOGESTREL AND ETHINYL ESTRADIOL 0.15-0.03
KIT ORAL
Qty: 4 PACKAGE | Refills: 3 | Status: SHIPPED | OUTPATIENT
Start: 2020-07-23 | End: 2020-12-01

## 2020-07-23 RX ORDER — BUSPIRONE HYDROCHLORIDE 10 MG/1
10 TABLET ORAL
Qty: 90 TAB | Refills: 1 | Status: SHIPPED | OUTPATIENT
Start: 2020-07-23 | End: 2021-10-05 | Stop reason: SDUPTHER

## 2020-07-23 RX ORDER — SPIRONOLACTONE 50 MG/1
TABLET, FILM COATED ORAL
Qty: 180 TAB | Refills: 1 | Status: SHIPPED | OUTPATIENT
Start: 2020-07-23 | End: 2021-02-19

## 2020-07-23 RX ORDER — DICYCLOMINE HYDROCHLORIDE 20 MG/1
TABLET ORAL
Qty: 270 TAB | Refills: 1 | Status: SHIPPED | OUTPATIENT
Start: 2020-07-23

## 2020-07-23 NOTE — PROGRESS NOTES
Chief Complaint   Patient presents with    Medication Refill     1. Have you been to the ER, urgent care clinic since your last visit? Hospitalized since your last visit? No    2. Have you seen or consulted any other health care providers outside of the [address] [place] [address] since your last visit? Include any pap smears or colon screening.  No    Reviewed record in preparation for visit and have necessary documentation  Pt did not bring medication to office visit for review  opportunity was given for questions  Goals that were addressed and/or need to be completed during or after this appointment include   Health Maintenance Due   Topic Date Due    DTaP/Tdap/Td series (1 - Tdap) 08/16/2003    PAP AKA CERVICAL CYTOLOGY  11/13/2020

## 2020-07-23 NOTE — PROGRESS NOTES
Michael Fowler is a 42 y.o. female evaluated via Telephone on 20. Patient Identity confirmed by . Consent:  He and/or health care decision maker is aware that that he may receive a bill for this telephone service, depending on his insurance coverage, and has provided verbal consent to proceed: Yes    Physician Location: Office  Patient Location: Home  Nurse Assisting with Encounter: Parris Avina LPN    Chief Complaint   Patient presents with    Medication Refill      Information gathered from patient and/or health care decision maker. HPI:   Patient is currently taking Spironolactone and April for Hormonal Control. After having Tubes tied had hormonal issues increasing skin rash/acne, weight changes. Patient does not have regular menstrual period at this. IBS: Taking Bentyl and Buspar to control anxiety that will agitate IBS. Mixed with predominantly Constipation and abdominal pain. Uyen Ralston and had significant side effects. Anxiety/Depression Follow up:  Patient is seen for anxiety. Current treatment includes buspar and no other therapies. Reports currently denies issues with work/school or home life. Currently denies any issues with relationships with family and friends. Overall clinical course is stable. Current Psychiatrist/Counselor: None  Effectiveness of current Regimen: effective  - Ongoing symptoms include: panic attacks with social events  - Exacerbated IBS  - Reported side effects from current treatment: none. Review of Systems   Constitutional: Negative for chills, fever and malaise/fatigue. HENT: Negative for congestion. Gastrointestinal: Negative for abdominal pain, nausea and vomiting. Limited Exam:  Due to this being a TeleHealth evaluation, many elements of the physical examination are unable to be assessed.       Constitutional: Pleasent and well-nourished in no apparent distress       Current Outpatient Medications on File Prior to Visit Medication Sig Dispense Refill    [DISCONTINUED] desogestreL-ethinyl estradioL (Apri) 0.15-0.03 mg tab TAKE ONE TABLET BY MOUTH EVERY DAY 1 Package 0    [DISCONTINUED] busPIRone (BUSPAR) 10 mg tablet Take 1 Tab by mouth three (3) times daily as needed (anxiety). Indications: Repeated Episodes of Anxiety 90 Tab 0    [DISCONTINUED] dicyclomine (BENTYL) 20 mg tablet TAKE TWO TABLETS BY MOUTH EVERY 6 HOURS 240 Tab 0    [DISCONTINUED] spironolactone (ALDACTONE) 50 mg tablet TAKE ONE TABLET BY MOUTH TWICE DAILY 180 Tab 1     No current facility-administered medications on file prior to visit. No Known Allergies     Patient Active Problem List    Diagnosis Date Noted    IUD (intrauterine device) in place 05/01/2012    Mild dysplasia of cervix (KEITH I) 09/22/2011        Health Maintenance Due   Topic Date Due    DTaP/Tdap/Td series (1 - Tdap) 08/16/2003    PAP AKA CERVICAL CYTOLOGY  11/13/2020        Assessment/Plan:  Details of this discussion including any medical advice provided: Refill on medications. ICD-10-CM ICD-9-CM    1. Hormonal disorder  E34.9 259.9 desogestreL-ethinyl estradioL (Apri) 0.15-0.03 mg tab      spironolactone (ALDACTONE) 50 mg tablet   2. Irritable bowel syndrome, unspecified type  K58.9 564.1 dicyclomine (BENTYL) 20 mg tablet   3. Anxiety  F41.9 300.00 busPIRone (BUSPAR) 10 mg tablet     Follow-up and Dispositions    · Return in about 4 months (around 11/23/2020) for Follow up for PAP testing in Office Visit. Total Time: minutes: 11-20 minutes was spent on telemedicine encounter discussing above problems and plans. Patient Problem list, medications, and Allergies were reviewed during this encounter.     Pursuant to the emergency declaration under the [facility] waiver authority and the [employer] Act, this Telephone Visit was conducted, with patient's consent, to reduce the patient's risk of exposure to COVID-19 and provide continuity of care for an established patient. I affirm this is a Patient Initiated Episode with an Established Patient who has not had a related appointment within my department in the past 7 days or scheduled within the next 24 hours. Discussed diagnoses in detail with patient. Medication risks/benefits/side effects discussed with patient. All of the patient's questions were addressed. The patient understands and agrees with our plan of care. The patient knows to call back if they are unsure of or forget any changes we discussed today or if the symptoms change.     Note: not billable if this call serves to triage the patient into an appointment for the relevant concern    MD ROYA Patterson & MARNI BAE [facility]  07/23/20

## 2021-02-19 DIAGNOSIS — E34.9 HORMONAL DISORDER: ICD-10-CM

## 2021-02-19 RX ORDER — SPIRONOLACTONE 50 MG/1
TABLET, FILM COATED ORAL
Qty: 60 TAB | Refills: 0 | Status: SHIPPED | OUTPATIENT
Start: 2021-02-19 | End: 2021-07-12 | Stop reason: SDUPTHER

## 2021-07-09 DIAGNOSIS — E34.9 HORMONAL DISORDER: ICD-10-CM

## 2021-07-09 RX ORDER — SPIRONOLACTONE 50 MG/1
TABLET, FILM COATED ORAL
Qty: 60 TABLET | Refills: 0 | OUTPATIENT
Start: 2021-07-09

## 2021-07-12 ENCOUNTER — TELEPHONE (OUTPATIENT)
Dept: FAMILY MEDICINE CLINIC | Age: 41
End: 2021-07-12

## 2021-07-12 DIAGNOSIS — E34.9 HORMONAL DISORDER: ICD-10-CM

## 2021-07-12 RX ORDER — SPIRONOLACTONE 50 MG/1
50 TABLET, FILM COATED ORAL 2 TIMES DAILY
Qty: 60 TABLET | Refills: 0 | Status: SHIPPED | OUTPATIENT
Start: 2021-07-12 | End: 2021-10-05 | Stop reason: SDUPTHER

## 2021-07-12 NOTE — TELEPHONE ENCOUNTER
Reorder called in, needs to attend next appt.     MD ROYA Pardo & MARNI BAE [facility]  07/12/21

## 2021-07-12 NOTE — TELEPHONE ENCOUNTER
Pt is going out of town. The next CPE you have is July 26th. Can you call her in enough SPIRONOLACTONE until then? Please call Pt if she cannot  from [facility], [facility]?

## 2021-10-05 ENCOUNTER — HOSPITAL ENCOUNTER (OUTPATIENT)
Dept: LAB | Age: 41
Discharge: HOME | End: 2021-10-05

## 2021-10-05 ENCOUNTER — OFFICE VISIT (OUTPATIENT)
Dept: FAMILY MEDICINE CLINIC | Age: 41
End: 2021-10-05

## 2021-10-05 VITALS
TEMPERATURE: 98.1 F | HEIGHT: 69 IN | SYSTOLIC BLOOD PRESSURE: 110 MMHG | OXYGEN SATURATION: 97 % | DIASTOLIC BLOOD PRESSURE: 68 MMHG | RESPIRATION RATE: 14 BRPM | WEIGHT: 150.6 LBS | BODY MASS INDEX: 22.31 KG/M2 | HEART RATE: 74 BPM

## 2021-10-05 DIAGNOSIS — E34.9 HORMONAL DISORDER: ICD-10-CM

## 2021-10-05 DIAGNOSIS — Z01.419 WELL WOMAN EXAM: Primary | ICD-10-CM

## 2021-10-05 DIAGNOSIS — K58.9 IRRITABLE BOWEL SYNDROME, UNSPECIFIED TYPE: ICD-10-CM

## 2021-10-05 DIAGNOSIS — F41.9 ANXIETY: ICD-10-CM

## 2021-10-05 PROCEDURE — 88175 CYTOPATH C/V AUTO FLUID REDO: CPT

## 2021-10-05 PROCEDURE — 87624 HPV HI-RISK TYP POOLED RSLT: CPT

## 2021-10-05 PROCEDURE — 87591 N.GONORRHOEAE DNA AMP PROB: CPT

## 2021-10-05 PROCEDURE — 99395 PREV VISIT EST AGE 18-39: CPT | Performed by: STUDENT IN AN ORGANIZED HEALTH CARE EDUCATION/TRAINING PROGRAM

## 2021-10-05 RX ORDER — DESOGESTREL AND ETHINYL ESTRADIOL 0.15-0.03
1 KIT ORAL DAILY
Qty: 3 DOSE PACK | Refills: 1 | Status: SHIPPED | OUTPATIENT
Start: 2021-10-05 | End: 2022-01-29 | Stop reason: SDUPTHER

## 2021-10-05 RX ORDER — DOCUSATE SODIUM 100 MG/1
100 CAPSULE, LIQUID FILLED ORAL
Qty: 60 CAPSULE | Refills: 2 | Status: SHIPPED | OUTPATIENT
Start: 2021-10-05 | End: 2022-01-03

## 2021-10-05 RX ORDER — BUSPIRONE HYDROCHLORIDE 10 MG/1
10 TABLET ORAL
Qty: 90 TABLET | Refills: 1 | Status: SHIPPED | OUTPATIENT
Start: 2021-10-05

## 2021-10-05 RX ORDER — SPIRONOLACTONE 50 MG/1
50 TABLET, FILM COATED ORAL 2 TIMES DAILY
Qty: 60 TABLET | Refills: 0 | Status: SHIPPED | OUTPATIENT
Start: 2021-10-05 | End: 2021-11-07

## 2021-10-05 NOTE — PROGRESS NOTES
HPI:  Carleen Brown is a 46 y.o. female presenting for well woman exam. She is COVID vaccinated. LMP : skips placebo pills on OCP pack  Length of period: 3-5 days  Menstrual flow: normal    Sexually active?:yes  Method of family planning: Apri  Diet: lots of water, fruits, vegetables, whole grains  Exercise: works as a     Immunizations - reviewed:   Immunization History   Administered Date(s) Administered    Covid-19, MODERNA, Mrna, Lnp-s, Pf, 100mcg/0.5mL 2021, 2021     Flu: declined today  S/p 2 doses of [address] Maintenance reviewed -  Pap smear: last normal 3 years ago, hx of KEITH 1  HIV testing: previous  Hepatitis C testing : previous    Allergies- reviewed:   No Known Allergies      Medications- reviewed:   Current Outpatient Medications   Medication Sig    docusate sodium (COLACE) 100 mg capsule Take 1 Capsule by mouth two (2) times daily as needed for Constipation for up to 90 days.  busPIRone (BUSPAR) 10 mg tablet Take 1 Tablet by mouth three (3) times daily as needed (anxiety). Indications: repeated episodes of anxiety    spironolactone (ALDACTONE) 50 mg tablet Take 1 Tablet by mouth two (2) times a day. Appt Required for Refills    desogestreL-ethinyl estradioL (Apri) 0.15-0.03 mg tab Take 1 Tablet by mouth daily. TAKE ONE TABLET BY MOUTH EVERY DAY    dicyclomine (BENTYL) 20 mg tablet Take 1 tablet three times a day     No current facility-administered medications for this visit. Past Medical History- reviewed:  Past Medical History:   Diagnosis Date    Acne     IBS (irritable bowel syndrome)          Past Surgical History- reviewed:   Past Surgical History:   Procedure Laterality Date    HX TUBAL LIGATION  2016         Family History - reviewed:  No family history on file.       Social History - reviewed:  Social History     Socioeconomic History    Marital status:      Spouse name: Not on file    Number of children: Not on file    Years of education: Not on file    Highest education level: Not on file   Occupational History    Not on file   Tobacco Use    Smoking status: Never Smoker    Smokeless tobacco: Never Used   Substance and Sexual Activity    Alcohol use: No    Drug use: No    Sexual activity: Yes     Partners: Male     Birth control/protection: Surgical, Pill   Other Topics Concern    Not on file   Social History Narrative    Not on file     Social Determinants of Health     Financial Resource Strain:     Difficulty of Paying Living Expenses:    Food Insecurity:     Worried About Running Out of Food in the Last Year:     [address] in the Last Year:    Transportation Needs:     Lack of Transportation (Medical):      Lack of Transportation (Non-Medical):    Physical Activity:     Days of Exercise per Week:     Minutes of Exercise per Session:    Stress:     Feeling of Stress :    Social Connections:     Frequency of Communication with Friends and Family:     Frequency of Social Gatherings with Friends and Family:     Attends Scientology Services:     Active Member of Clubs or Organizations:     Attends Club or Organization Meetings:     Marital Status:    Intimate Partner Violence:     Fear of Current or Ex-Partner:     Emotionally Abused:     Physically Abused:     Sexually Abused:            Review of Systems   CONSTITUTIONAL: Denies: fever, chills  BREASTS: No masses or nipple discharge      Physical Exam  Visit Vitals  /68   Pulse 74   Temp 98.1 °F (36.7 °C) (Oral)   Resp 14   Ht 5' 8.5\" (1.74 m)   Wt 150 lb 9.6 oz (68.3 kg)   LMP 08/15/2021 (Within Weeks) Comment: birth control, hasn't had a period due to birth control    SpO2 97%   BMI 22.57 kg/m²       General appearance - alert, well appearing, and in no distress  Ears - bilateral TM's and external ear canals normal  Nose - normal and patent, no erythema, discharge or polyps  Mouth - mucous membranes moist, pharynx normal without lesions  Neck - supple, no significant adenopathy, thyroid exam: thyroid is normal in size without nodules or tenderness  Chest - clear to auscultation, no wheezes, rales or rhonchi, symmetric air entry  Heart - normal rate, regular rhythm, normal S1, S2, no murmurs, rubs, clicks or gallops  Abdomen - soft, nontender, nondistended, no masses or organomegaly  Neurological - alert, oriented, normal speech, no focal findings or movement disorder noted  Musculoskeletal - no joint tenderness, deformity or swelling  Extremities - no pedal edema noted  Skin - normal coloration and turgor, no rashes, no suspicious skin lesions noted  Pelvic - Exam chaperoned by Cori Solano LPN. External genitalia normal without rashes or lesions. Pink and moist vaginal mucosa. Scant white discharge. Cervix with erythematous lesion around cervical os. Uterus non tender and normal size. No adnexal masses or tenderness. Breast - deferred      Assessment/Plan:    ICD-10-CM ICD-9-CM    1. Well woman exam  Z01.419 V72.31 PAP IG, CT-NG-TV, APTIMA HPV AND [address] 98/00,68([id number])   2. Irritable bowel syndrome, unspecified type  K58.9 564.1 docusate sodium (COLACE) 100 mg capsule   3. Anxiety  F41.9 300.00 busPIRone (BUSPAR) 10 mg tablet   4. Hormonal disorder  E34.9 259.9 spironolactone (ALDACTONE) 50 mg tablet      desogestreL-ethinyl estradioL (Apri) 0.15-0.03 mg tab         I have discussed the diagnosis with the patient and the intended plan as seen in the above orders. The patient has received an after-visit summary and questions were answered concerning future plans. I have discussed medication side effects and warnings with the patient as well. Informed pt to return to the office if new symptoms arise.        Dequan Chock, DO

## 2021-10-05 NOTE — LETTER
10/5/2021 1:45 PM    Ms. Michelle Taylor  [address] [facility]      To Whom It May Concern:    Michelle Taylor is currently under the care of Rossy Jordan. Her medical conditions preclude her from participating in jury duty. If there are questions or concerns please have the patient contact our office.         Sincerely,      Pablo Hector, DO

## 2021-10-05 NOTE — PATIENT INSTRUCTIONS
Diet for Irritable Bowel Syndrome: Care Instructions  Overview     Irritable bowel syndrome, or IBS, is a problem with the intestines. IBS can cause belly pain, bloating, gas, constipation, and diarrhea. Most people can control their symptoms by changing their diet and easing stress. No specific foods cause everyone with IBS to have symptoms. Many people find that they feel better by limiting or eliminating foods that may bring on symptoms. Make sure you don't stop eating all foods from any one food group without talking with a dietitian. You need to make sure you are still getting all the nutrients you need. Follow-up care is a key part of your treatment and safety. Be sure to make and go to all appointments, and call your doctor if you are having problems. It's also a good idea to know your test results and keep a list of the medicines you take. How can you care for yourself at home? To reduce constipation  · Check with your doctor about increasing the amount of fiber you eat. If your doctor recommends more fiber:  ? Slowly increase the amount of fiber you eat. For some people who have IBS, eating more fiber may make some symptoms worse. This includes bloating. Adding fiber slowly may help you avoid these problems. ? Include fruits, vegetables, beans, and whole grains in your diet each day. These foods are high in fiber. ? Take a fiber supplement, such as Citrucel or Metamucil, every day if needed. Read and follow all instructions on the label. · Drink plenty of fluids. If you have kidney, heart, or liver disease and have to limit fluids, talk with your doctor before you increase the amount of fluids you drink. · Get some exercise every day. Build up slowly to 30 to 60 minutes a day on 5 or more days of the week. · Schedule time each day for a bowel movement. Having a daily routine may help. Take your time and do not strain when having a bowel movement.   To reduce diarrhea  You may try giving up foods or drinks one at a time to see whether symptoms improve. Limit or avoid the following:  · Alcohol  · Caffeine, which is found in coffee, tea, cola drinks, and chocolate  · Nicotine, from smoking or chewing tobacco  · Gas-producing foods, such as beans, broccoli, cabbage, and apples  · Dairy products that contain lactose (milk sugar), such as ice cream and milk. · Foods and drinks high in sugar, especially fruit juice, soda, candy, and other packaged sweets (such as cookies)  · Foods high in fat, including garcia, sausage, butter, oils, and anything deep-fried  · Sorbitol and xylitol, artificial sweeteners found in some sugarless candies and chewing gum  Keep track of foods  · Some people with IBS use a daily food diary to keep track of what they eat and whether they have any symptoms after eating certain foods. The diary also can be a good way to record what is going on in your life. · Stress plays a role in IBS. So if you are aware that certain stresses bring on symptoms, you can try to reduce those stresses. Keep mealtimes pleasant  · Try to maintain a pleasant environment when you eat. This may reduce stress that can make symptoms likely to occur. · Give yourself plenty of time to eat, rather than eating on the go. Chew your food slowly. Try not to swallow air, which can cause bloating. Where can you learn more? Go to http://www.gray.com/  Enter C768 in the search box to learn more about \"Diet for Irritable Bowel Syndrome: Care Instructions. \"  Current as of: December 17, 2020               Content Version: 13.0  © 4927-7118 [employer]. Care instructions adapted under license by [employer] (which disclaims liability or warranty for this information).  If you have questions about a medical condition or this instruction, always ask your healthcare professional. [address] any warranty or liability for your use of this information.

## 2021-10-05 NOTE — PROGRESS NOTES
1. Have you been to the ER, urgent care clinic since your last visit? Hospitalized since your last visit? No    2. Have you seen or consulted any other health care providers outside of the [address] [place] [address] since your last visit? Include any pap smears or colon screening. No    Reviewed record in preparation for visit and have necessary documentation  Pt did not bring medication to office visit for review  Patient is accompanied by self I have received verbal consent from Billy Thomas to discuss any/all medical information while they are present in the room.     Goals that were addressed and/or need to be completed during or after this appointment include     Health Maintenance Due   Topic Date Due    Hepatitis C Screening  Never done    COVID-19 Vaccine (1) Never done    DTaP/Tdap/Td series (1 - Tdap) Never done    Cervical cancer screen  11/13/2020    Flu Vaccine (1) Never done

## 2021-10-08 ENCOUNTER — PATIENT MESSAGE (OUTPATIENT)
Dept: FAMILY MEDICINE CLINIC | Age: 41
End: 2021-10-08

## 2021-10-12 LAB
C TRACH RRNA SPEC QL NAA+PROBE: NEGATIVE
N GONORRHOEA RRNA SPEC QL NAA+PROBE: NEGATIVE
SPECIMEN SOURCE: NORMAL
T VAGINALIS RRNA SPEC QL NAA+PROBE: NEGATIVE

## 2021-11-05 DIAGNOSIS — E34.9 HORMONAL DISORDER: ICD-10-CM

## 2021-11-07 ENCOUNTER — PATIENT MESSAGE (OUTPATIENT)
Dept: FAMILY MEDICINE CLINIC | Age: 41
End: 2021-11-07

## 2021-11-07 DIAGNOSIS — E34.9 HORMONAL DISORDER: Primary | ICD-10-CM

## 2021-11-07 RX ORDER — SPIRONOLACTONE 50 MG/1
TABLET, FILM COATED ORAL
Qty: 60 TABLET | Refills: 0 | Status: SHIPPED | OUTPATIENT
Start: 2021-11-07 | End: 2021-12-02 | Stop reason: SDUPTHER

## 2021-12-02 ENCOUNTER — PATIENT MESSAGE (OUTPATIENT)
Dept: FAMILY MEDICINE CLINIC | Age: 41
End: 2021-12-02

## 2021-12-02 DIAGNOSIS — E34.9 HORMONAL DISORDER: ICD-10-CM

## 2021-12-02 RX ORDER — SPIRONOLACTONE 50 MG/1
50 TABLET, FILM COATED ORAL 2 TIMES DAILY
Qty: 60 TABLET | Refills: 0 | Status: SHIPPED | OUTPATIENT
Start: 2021-12-02

## 2022-01-29 DIAGNOSIS — E34.9 HORMONAL DISORDER: ICD-10-CM

## 2022-01-31 RX ORDER — DESOGESTREL AND ETHINYL ESTRADIOL 0.15-0.03
1 KIT ORAL DAILY
Qty: 3 DOSE PACK | Refills: 1 | Status: SHIPPED | OUTPATIENT
Start: 2022-01-31 | End: 2022-06-09

## 2022-03-18 RX ORDER — RIZATRIPTAN BENZOATE 10 MG/1
TABLET ORAL
Qty: 4 TABLET | Refills: 0 | Status: SHIPPED | OUTPATIENT
Start: 2022-03-18

## 2022-06-09 DIAGNOSIS — E34.9 HORMONAL DISORDER: ICD-10-CM

## 2022-06-09 RX ORDER — DESOGESTREL AND ETHINYL ESTRADIOL 0.15-0.03
KIT ORAL
Qty: 1 DOSE PACK | Refills: 3 | Status: SHIPPED | OUTPATIENT
Start: 2022-06-09

## 2022-11-10 DIAGNOSIS — F41.9 ANXIETY: ICD-10-CM

## 2022-11-10 DIAGNOSIS — K58.9 IRRITABLE BOWEL SYNDROME, UNSPECIFIED TYPE: ICD-10-CM

## 2022-11-10 RX ORDER — BUSPIRONE HYDROCHLORIDE 10 MG/1
10 TABLET ORAL
Qty: 90 TABLET | Refills: 1 | Status: SHIPPED | OUTPATIENT
Start: 2022-11-10

## 2022-11-10 RX ORDER — DICYCLOMINE HYDROCHLORIDE 20 MG/1
TABLET ORAL
Qty: 270 TABLET | Refills: 1 | Status: SHIPPED | OUTPATIENT
Start: 2022-11-10

## 2022-12-13 DIAGNOSIS — E34.9 HORMONAL DISORDER: ICD-10-CM

## 2022-12-13 RX ORDER — DESOGESTREL AND ETHINYL ESTRADIOL 0.15-0.03
KIT ORAL
Refills: 0 | OUTPATIENT
Start: 2022-12-13

## 2022-12-15 NOTE — TELEPHONE ENCOUNTER
Call placed to pt and message left. If she calls please inform pt appt is required. Also spoke with pharmacy

## 2023-01-03 ENCOUNTER — TELEPHONE (OUTPATIENT)
Dept: FAMILY MEDICINE CLINIC | Age: 43
End: 2023-01-03

## 2023-01-03 NOTE — TELEPHONE ENCOUNTER
Pt is calling about her prescription for hormonal acne. Last OV she was told she did not have to have a PAP until 5 years. It is in her chart.(Dr. Palak Duffy). The Pt is self-pay and cannot afford OV like that. Please call Pt.

## 2023-05-22 RX ORDER — DICYCLOMINE HCL 20 MG
1 TABLET ORAL 3 TIMES DAILY
COMMUNITY
Start: 2022-11-10 | End: 2023-06-19 | Stop reason: SDUPTHER

## 2023-05-22 RX ORDER — RIZATRIPTAN BENZOATE 10 MG/1
TABLET ORAL
COMMUNITY
Start: 2022-03-18 | End: 2023-06-19 | Stop reason: ALTCHOICE

## 2023-05-22 RX ORDER — SPIRONOLACTONE 50 MG/1
50 TABLET, FILM COATED ORAL 2 TIMES DAILY
COMMUNITY
Start: 2021-12-02 | End: 2023-06-19 | Stop reason: SDUPTHER

## 2023-05-22 RX ORDER — DESOGESTREL AND ETHINYL ESTRADIOL 0.15-0.03
1 KIT ORAL DAILY
COMMUNITY
Start: 2022-06-09 | End: 2023-06-19 | Stop reason: SDUPTHER

## 2023-05-22 RX ORDER — BUSPIRONE HYDROCHLORIDE 10 MG/1
10 TABLET ORAL 3 TIMES DAILY PRN
COMMUNITY
Start: 2022-11-10 | End: 2023-06-19 | Stop reason: SDUPTHER

## 2023-06-19 ENCOUNTER — OFFICE VISIT (OUTPATIENT)
Facility: CLINIC | Age: 43
End: 2023-06-19

## 2023-06-19 VITALS
HEART RATE: 74 BPM | WEIGHT: 155.2 LBS | SYSTOLIC BLOOD PRESSURE: 101 MMHG | TEMPERATURE: 97.2 F | OXYGEN SATURATION: 100 % | RESPIRATION RATE: 18 BRPM | DIASTOLIC BLOOD PRESSURE: 63 MMHG | HEIGHT: 69 IN | BODY MASS INDEX: 22.99 KG/M2

## 2023-06-19 DIAGNOSIS — E34.9 ENDOCRINE DISORDER, UNSPECIFIED: Primary | ICD-10-CM

## 2023-06-19 DIAGNOSIS — Z13.220 LIPID SCREENING: ICD-10-CM

## 2023-06-19 DIAGNOSIS — K58.9 IRRITABLE BOWEL SYNDROME WITHOUT DIARRHEA: ICD-10-CM

## 2023-06-19 DIAGNOSIS — Z11.59 NEED FOR HEPATITIS C SCREENING TEST: ICD-10-CM

## 2023-06-19 DIAGNOSIS — F41.1 GENERALIZED ANXIETY DISORDER: ICD-10-CM

## 2023-06-19 PROCEDURE — 99214 OFFICE O/P EST MOD 30 MIN: CPT | Performed by: FAMILY MEDICINE

## 2023-06-19 RX ORDER — DICYCLOMINE HCL 20 MG
20 TABLET ORAL 3 TIMES DAILY PRN
Qty: 120 TABLET | Refills: 1 | Status: SHIPPED | OUTPATIENT
Start: 2023-06-19

## 2023-06-19 RX ORDER — SPIRONOLACTONE 50 MG/1
50 TABLET, FILM COATED ORAL 2 TIMES DAILY
Qty: 180 TABLET | Refills: 1 | Status: SHIPPED | OUTPATIENT
Start: 2023-06-19

## 2023-06-19 RX ORDER — DESOGESTREL AND ETHINYL ESTRADIOL 0.15-0.03
1 KIT ORAL DAILY
Qty: 4 PACKET | Refills: 3 | Status: SHIPPED | OUTPATIENT
Start: 2023-06-19

## 2023-06-19 RX ORDER — BUSPIRONE HYDROCHLORIDE 10 MG/1
10 TABLET ORAL 3 TIMES DAILY PRN
Qty: 90 TABLET | Refills: 1 | Status: SHIPPED | OUTPATIENT
Start: 2023-06-19

## 2023-06-19 SDOH — ECONOMIC STABILITY: INCOME INSECURITY: HOW HARD IS IT FOR YOU TO PAY FOR THE VERY BASICS LIKE FOOD, HOUSING, MEDICAL CARE, AND HEATING?: NOT HARD AT ALL

## 2023-06-19 SDOH — ECONOMIC STABILITY: FOOD INSECURITY: WITHIN THE PAST 12 MONTHS, THE FOOD YOU BOUGHT JUST DIDN'T LAST AND YOU DIDN'T HAVE MONEY TO GET MORE.: NEVER TRUE

## 2023-06-19 SDOH — ECONOMIC STABILITY: FOOD INSECURITY: WITHIN THE PAST 12 MONTHS, YOU WORRIED THAT YOUR FOOD WOULD RUN OUT BEFORE YOU GOT MONEY TO BUY MORE.: NEVER TRUE

## 2023-06-19 SDOH — ECONOMIC STABILITY: HOUSING INSECURITY
IN THE LAST 12 MONTHS, WAS THERE A TIME WHEN YOU DID NOT HAVE A STEADY PLACE TO SLEEP OR SLEPT IN A SHELTER (INCLUDING NOW)?: NO

## 2023-06-19 ASSESSMENT — ENCOUNTER SYMPTOMS: CHEST TIGHTNESS: 0

## 2023-06-19 ASSESSMENT — ANXIETY QUESTIONNAIRES
3. WORRYING TOO MUCH ABOUT DIFFERENT THINGS: 0
4. TROUBLE RELAXING: 0
2. NOT BEING ABLE TO STOP OR CONTROL WORRYING: 0
7. FEELING AFRAID AS IF SOMETHING AWFUL MIGHT HAPPEN: 0
5. BEING SO RESTLESS THAT IT IS HARD TO SIT STILL: 0
GAD7 TOTAL SCORE: 0
1. FEELING NERVOUS, ANXIOUS, OR ON EDGE: 0
IF YOU CHECKED OFF ANY PROBLEMS ON THIS QUESTIONNAIRE, HOW DIFFICULT HAVE THESE PROBLEMS MADE IT FOR YOU TO DO YOUR WORK, TAKE CARE OF THINGS AT HOME, OR GET ALONG WITH OTHER PEOPLE: NOT DIFFICULT AT ALL
6. BECOMING EASILY ANNOYED OR IRRITABLE: 0

## 2023-06-19 ASSESSMENT — PATIENT HEALTH QUESTIONNAIRE - PHQ9
2. FEELING DOWN, DEPRESSED OR HOPELESS: 0
SUM OF ALL RESPONSES TO PHQ QUESTIONS 1-9: 0
SUM OF ALL RESPONSES TO PHQ QUESTIONS 1-9: 0
SUM OF ALL RESPONSES TO PHQ9 QUESTIONS 1 & 2: 0
SUM OF ALL RESPONSES TO PHQ QUESTIONS 1-9: 0
1. LITTLE INTEREST OR PLEASURE IN DOING THINGS: 0
SUM OF ALL RESPONSES TO PHQ QUESTIONS 1-9: 0

## 2023-06-19 NOTE — PROGRESS NOTES
1. \"Have you been to the ER, urgent care clinic since your last visit? Hospitalized since your last visit? \" no    2. \"Have you seen or consulted any other health care providers outside of the [address] [place] [address] since your last visit? \"no        Health Maintenance Due   Topic Date Due    Varicella vaccine (1 of 2 - 2-dose childhood series) Never done    HIV screen  Never done    Hepatitis C screen  Never done    DTaP/Tdap/Td vaccine (1 - Tdap) Never done    COVID-19 Vaccine (3 - Booster for Moderna series) 11/25/2021    Lipids  Never done    Depression Screen  10/05/2022

## 2023-06-19 NOTE — PROGRESS NOTES
[facility]    History of Present Illness:   Erin Dos Santos is a 38 y.o. female with history of Skin issues, Hormnal issues, Anxiety And depression  CC: Skin Rash  History provided by patient and Records    HPI:  Hormonal Issues: Patient was taking Apri and Spironolactone for Skin issues. Patient has normal periods off of birth control. Patient stopped 6 months ago and noting cystic lesions returning. Anxiety:  Patient takes Buspar as needed for anxiety attacks. IBS: Patient takes Dicyclomine for IBS flare ups as needed. Health Maintenance  Health Maintenance Due   Topic Date Due    Hepatitis C screen  Never done    COVID-19 Vaccine (3 - Booster for Moderna series) 11/25/2021    Lipids  Never done    Depression Screen  10/05/2022       Past Medical, Family, and Social History:     No current outpatient medications on file prior to visit. No current facility-administered medications on file prior to visit.        Patient Active Problem List   Diagnosis    Mild dysplasia of cervix (NATE I)    IUD (intrauterine device) in place    Irritable bowel syndrome without diarrhea    Generalized anxiety disorder    Endocrine disorder, unspecified       Social History     Socioeconomic History    Marital status:      Spouse name: None    Number of children: None    Years of education: None    Highest education level: None   Tobacco Use    Smoking status: Never    Smokeless tobacco: Never   Vaping Use    Vaping Use: Never used   Substance and Sexual Activity    Alcohol use: No    Drug use: No     Social Determinants of Health     Financial Resource Strain: Low Risk     Difficulty of Paying Living Expenses: Not hard at all   Food Insecurity: No Food Insecurity    Worried About Running Out of Food in the Last Year: Never true    Ran Out of Food in the Last Year: Never true   Transportation Needs: Unknown    Lack of Transportation (Non-Medical): No   Housing Stability: Unknown

## 2023-06-20 DIAGNOSIS — Z11.59 NEED FOR HEPATITIS C SCREENING TEST: Primary | ICD-10-CM

## 2023-06-20 DIAGNOSIS — Z13.220 LIPID SCREENING: ICD-10-CM

## 2023-06-20 DIAGNOSIS — K58.9 IRRITABLE BOWEL SYNDROME WITHOUT DIARRHEA: ICD-10-CM

## 2023-06-20 NOTE — PROGRESS NOTES
Labs Reordered.     MD JOSÉ Ferrara & ZEYAD AZEVEDO [facility]  06/20/23

## 2023-10-31 ENCOUNTER — TELEPHONE (OUTPATIENT)
Facility: CLINIC | Age: 43
End: 2023-10-31

## 2023-10-31 DIAGNOSIS — N92.6 ABNORMAL MENSTRUAL PERIODS: Primary | ICD-10-CM

## 2023-10-31 NOTE — TELEPHONE ENCOUNTER
Pt called stating that since  re-starting desogestrel-ethinyl estradiol (APRI) 0.15-30 MG-MCG per tablet in June, she has been spotting throughout the whole month for the whole month. Pt is inquiring if Dr. Polina Paiz could change the strength or change to a different bc, to help with this issue. Please advise. ..

## 2023-11-02 ENCOUNTER — TELEPHONE (OUTPATIENT)
Facility: CLINIC | Age: 43
End: 2023-11-02

## 2023-11-02 RX ORDER — NORGESTIMATE AND ETHINYL ESTRADIOL 7DAYSX3 28
1 KIT ORAL DAILY
Qty: 3 PACKET | Refills: 3 | Status: SHIPPED | OUTPATIENT
Start: 2023-11-02

## 2024-04-01 ENCOUNTER — OFFICE VISIT (OUTPATIENT)
Facility: CLINIC | Age: 44
End: 2024-04-01

## 2024-04-01 VITALS
BODY MASS INDEX: 23.08 KG/M2 | TEMPERATURE: 98.7 F | HEART RATE: 80 BPM | DIASTOLIC BLOOD PRESSURE: 71 MMHG | WEIGHT: 155.8 LBS | SYSTOLIC BLOOD PRESSURE: 113 MMHG | RESPIRATION RATE: 14 BRPM | OXYGEN SATURATION: 100 % | HEIGHT: 69 IN

## 2024-04-01 DIAGNOSIS — H10.9 BACTERIAL CONJUNCTIVITIS: Primary | ICD-10-CM

## 2024-04-01 DIAGNOSIS — B96.89 ACUTE BACTERIAL SINUSITIS: ICD-10-CM

## 2024-04-01 DIAGNOSIS — J01.90 ACUTE BACTERIAL SINUSITIS: ICD-10-CM

## 2024-04-01 DIAGNOSIS — F41.1 GENERALIZED ANXIETY DISORDER: ICD-10-CM

## 2024-04-01 PROCEDURE — 99214 OFFICE O/P EST MOD 30 MIN: CPT | Performed by: FAMILY MEDICINE

## 2024-04-01 RX ORDER — CIPROFLOXACIN HYDROCHLORIDE 3.5 MG/ML
1 SOLUTION/ DROPS TOPICAL 4 TIMES DAILY
Qty: 10 ML | Refills: 0 | Status: SHIPPED | OUTPATIENT
Start: 2024-04-01 | End: 2024-04-01

## 2024-04-01 RX ORDER — CIPROFLOXACIN HYDROCHLORIDE 3.5 MG/ML
1 SOLUTION/ DROPS TOPICAL 4 TIMES DAILY
Qty: 10 ML | Refills: 0 | Status: SHIPPED | OUTPATIENT
Start: 2024-04-01

## 2024-04-01 RX ORDER — AMOXICILLIN AND CLAVULANATE POTASSIUM 875; 125 MG/1; MG/1
1 TABLET, FILM COATED ORAL 2 TIMES DAILY
Qty: 14 TABLET | Refills: 0 | Status: SHIPPED | OUTPATIENT
Start: 2024-04-01 | End: 2024-04-01

## 2024-04-01 RX ORDER — BUSPIRONE HYDROCHLORIDE 10 MG/1
10 TABLET ORAL 3 TIMES DAILY PRN
Qty: 90 TABLET | Refills: 1 | Status: SHIPPED | OUTPATIENT
Start: 2024-04-01

## 2024-04-01 RX ORDER — AMOXICILLIN AND CLAVULANATE POTASSIUM 875; 125 MG/1; MG/1
1 TABLET, FILM COATED ORAL 2 TIMES DAILY
Qty: 14 TABLET | Refills: 0 | Status: SHIPPED | OUTPATIENT
Start: 2024-04-01 | End: 2024-04-04 | Stop reason: SDUPTHER

## 2024-04-01 ASSESSMENT — PATIENT HEALTH QUESTIONNAIRE - PHQ9
2. FEELING DOWN, DEPRESSED OR HOPELESS: NOT AT ALL
SUM OF ALL RESPONSES TO PHQ QUESTIONS 1-9: 0
1. LITTLE INTEREST OR PLEASURE IN DOING THINGS: NOT AT ALL
SUM OF ALL RESPONSES TO PHQ9 QUESTIONS 1 & 2: 0

## 2024-04-01 ASSESSMENT — ENCOUNTER SYMPTOMS
EYE DISCHARGE: 1
SINUS PRESSURE: 1
EYE ITCHING: 1
EYE REDNESS: 1

## 2024-04-01 ASSESSMENT — VISUAL ACUITY: OU: 1

## 2024-04-01 NOTE — PROGRESS NOTES
Chief Complaint   Patient presents with    Sinus Problem         \"Have you been to the ER, urgent care clinic since your last visit?  Hospitalized since your last visit?\"    NO    “Have you seen or consulted any other health care providers outside of [facility] since your last visit?”    NO           Health Maintenance Due   Topic Date Due    Hepatitis B vaccine (1 of 3 - 3-dose series) Never done    Hepatitis C screen  Never done    Lipids  Never done    COVID-19 Vaccine (3 - 2023-24 season) 09/01/2023

## 2024-04-01 NOTE — PROGRESS NOTES
[facility]    History of Present Illness:   Treva Medina is a 43 y.o. female with history of Skin issues, Hormnal issues, Anxiety And depression   CC: Sinus pressure  History provided by patient and Records    HPI:  Patient had some sinus congestion and tried to clear with nasal rinse, but did not help, then developed bilateral eye redness, itching, and drainage.  Following this developed severe sinus pressure and pressure behind the eyes and then throat discomfort.  Ongoing for about a week.  Gradually worsening.  Does have allergies normally.     Health Maintenance  Health Maintenance Due   Topic Date Due    Hepatitis B vaccine (1 of 3 - 3-dose series) Never done    Hepatitis C screen  Never done    Lipids  Never done    COVID-19 Vaccine (3 - 2023-24 season) 09/01/2023       Past Medical, Family, and Social History:     Current Outpatient Medications on File Prior to Visit   Medication Sig Dispense Refill    Norgestim-Eth Estrad Triphasic (TRI-SPRINTEC) 0.18/0.215/0.25 MG-35 MCG TABS Take 1 tablet by mouth daily 3 packet 3    desogestrel-ethinyl estradiol (APRI) 0.15-30 MG-MCG per tablet Take 1 tablet by mouth daily 4 packet 3    busPIRone (BUSPAR) 10 MG tablet Take 1 tablet by mouth 3 times daily as needed (Anxiety) 90 tablet 1    dicyclomine (BENTYL) 20 MG tablet Take 1 tablet by mouth 3 times daily as needed (Stomach pain) 120 tablet 1    spironolactone (ALDACTONE) 50 MG tablet Take 1 tablet by mouth 2 times daily 180 tablet 1     No current facility-administered medications on file prior to visit.       Patient Active Problem List   Diagnosis    Mild dysplasia of cervix (NATE I)    IUD (intrauterine device) in place    Irritable bowel syndrome without diarrhea    Generalized anxiety disorder    Endocrine disorder, unspecified       Social History     Socioeconomic History    Marital status:      Spouse name: None    Number of children: None    Years of education: None

## 2024-04-04 ENCOUNTER — TELEPHONE (OUTPATIENT)
Facility: CLINIC | Age: 44
End: 2024-04-04

## 2024-04-04 DIAGNOSIS — B96.89 ACUTE BACTERIAL SINUSITIS: ICD-10-CM

## 2024-04-04 DIAGNOSIS — J01.90 ACUTE BACTERIAL SINUSITIS: ICD-10-CM

## 2024-04-04 DIAGNOSIS — H10.9 BACTERIAL CONJUNCTIVITIS: ICD-10-CM

## 2024-04-04 RX ORDER — AMOXICILLIN AND CLAVULANATE POTASSIUM 875; 125 MG/1; MG/1
1 TABLET, FILM COATED ORAL 2 TIMES DAILY
Qty: 14 TABLET | Refills: 0 | Status: SHIPPED | OUTPATIENT
Start: 2024-04-04 | End: 2024-04-04

## 2024-04-04 RX ORDER — AMOXICILLIN AND CLAVULANATE POTASSIUM 875; 125 MG/1; MG/1
1 TABLET, FILM COATED ORAL 2 TIMES DAILY
Qty: 14 TABLET | Refills: 0 | Status: SHIPPED | OUTPATIENT
Start: 2024-04-04 | End: 2024-04-11

## 2024-04-04 NOTE — TELEPHONE ENCOUNTER
Pt accidentally lost her Amoxicillin today, while out of town. Pt has look everywhere and can not find it. Pt has only taken 2 day worth of medication. Please send another RX for the remaining 5 days worth to [facility].

## 2024-07-19 DIAGNOSIS — N92.6 ABNORMAL MENSTRUAL PERIODS: ICD-10-CM

## 2024-07-19 RX ORDER — NORGESTIMATE AND ETHINYL ESTRADIOL 7DAYSX3 28
1 KIT ORAL DAILY
Qty: 3 PACKET | Refills: 3 | Status: SHIPPED | OUTPATIENT
Start: 2024-07-19

## 2025-01-13 DIAGNOSIS — F41.1 GENERALIZED ANXIETY DISORDER: ICD-10-CM

## 2025-01-13 DIAGNOSIS — K58.9 IRRITABLE BOWEL SYNDROME WITHOUT DIARRHEA: ICD-10-CM

## 2025-01-13 RX ORDER — DICYCLOMINE HCL 20 MG
20 TABLET ORAL 3 TIMES DAILY PRN
Qty: 120 TABLET | Refills: 1 | Status: SHIPPED | OUTPATIENT
Start: 2025-01-13

## 2025-01-13 RX ORDER — BUSPIRONE HYDROCHLORIDE 10 MG/1
10 TABLET ORAL 3 TIMES DAILY PRN
Qty: 90 TABLET | Refills: 1 | Status: SHIPPED | OUTPATIENT
Start: 2025-01-13

## 2025-01-29 ENCOUNTER — TELEPHONE (OUTPATIENT)
Facility: CLINIC | Age: 45
End: 2025-01-29

## 2025-01-29 DIAGNOSIS — N92.6 ABNORMAL MENSTRUAL PERIODS: ICD-10-CM

## 2025-01-29 RX ORDER — NORGESTIMATE AND ETHINYL ESTRADIOL 7DAYSX3 28
1 KIT ORAL DAILY
Qty: 1 PACKET | Refills: 0 | Status: SHIPPED | OUTPATIENT
Start: 2025-01-29

## 2025-01-29 NOTE — TELEPHONE ENCOUNTER
Pt called to request a refill on the following medication:  -Norgestim-Eth Estrad Triphasic (TRI-SPRINTEC) 0.18/0.215/0.25 MG-35 MCG   Pt is also requesting that 4 packs be sent in instead of 3(pt does not take the placebo pills and runs out of medication faster).    (Pt only has 2 days of left)    Please advise..

## 2025-01-29 NOTE — TELEPHONE ENCOUNTER
Sent order for 1 pack, patient needs appt for 90 day fills.    Sly Jackson MD  [facility]  01/29/25

## 2025-02-10 ENCOUNTER — OFFICE VISIT (OUTPATIENT)
Facility: CLINIC | Age: 45
End: 2025-02-10
Payer: COMMERCIAL

## 2025-02-10 VITALS
DIASTOLIC BLOOD PRESSURE: 65 MMHG | WEIGHT: 155 LBS | TEMPERATURE: 97.3 F | BODY MASS INDEX: 22.96 KG/M2 | HEIGHT: 69 IN | RESPIRATION RATE: 18 BRPM | HEART RATE: 66 BPM | SYSTOLIC BLOOD PRESSURE: 110 MMHG | OXYGEN SATURATION: 98 %

## 2025-02-10 DIAGNOSIS — Z11.59 NEED FOR HEPATITIS C SCREENING TEST: ICD-10-CM

## 2025-02-10 DIAGNOSIS — N92.6 ABNORMAL MENSTRUAL PERIODS: ICD-10-CM

## 2025-02-10 DIAGNOSIS — E34.9 ENDOCRINE DISORDER, UNSPECIFIED: ICD-10-CM

## 2025-02-10 DIAGNOSIS — Z13.220 LIPID SCREENING: ICD-10-CM

## 2025-02-10 DIAGNOSIS — F41.1 GENERALIZED ANXIETY DISORDER: ICD-10-CM

## 2025-02-10 DIAGNOSIS — K58.9 IRRITABLE BOWEL SYNDROME WITHOUT DIARRHEA: Primary | ICD-10-CM

## 2025-02-10 PROCEDURE — 99214 OFFICE O/P EST MOD 30 MIN: CPT | Performed by: FAMILY MEDICINE

## 2025-02-10 RX ORDER — CITALOPRAM HYDROBROMIDE 10 MG/1
10 TABLET ORAL DAILY
Qty: 30 TABLET | Refills: 1 | Status: SHIPPED | OUTPATIENT
Start: 2025-02-10

## 2025-02-10 RX ORDER — CITALOPRAM HYDROBROMIDE 10 MG/1
10 TABLET ORAL DAILY
Qty: 30 TABLET | Refills: 1 | Status: SHIPPED | OUTPATIENT
Start: 2025-02-10 | End: 2025-02-10

## 2025-02-10 RX ORDER — NORGESTIMATE AND ETHINYL ESTRADIOL 7DAYSX3 28
1 KIT ORAL DAILY
Qty: 90 TABLET | Refills: 1 | Status: SHIPPED | OUTPATIENT
Start: 2025-02-10

## 2025-02-10 SDOH — ECONOMIC STABILITY: FOOD INSECURITY: WITHIN THE PAST 12 MONTHS, THE FOOD YOU BOUGHT JUST DIDN'T LAST AND YOU DIDN'T HAVE MONEY TO GET MORE.: PATIENT DECLINED

## 2025-02-10 SDOH — ECONOMIC STABILITY: FOOD INSECURITY: WITHIN THE PAST 12 MONTHS, YOU WORRIED THAT YOUR FOOD WOULD RUN OUT BEFORE YOU GOT MONEY TO BUY MORE.: PATIENT DECLINED

## 2025-02-10 ASSESSMENT — PATIENT HEALTH QUESTIONNAIRE - PHQ9
1. LITTLE INTEREST OR PLEASURE IN DOING THINGS: NOT AT ALL
SUM OF ALL RESPONSES TO PHQ QUESTIONS 1-9: 0
2. FEELING DOWN, DEPRESSED OR HOPELESS: NOT AT ALL
SUM OF ALL RESPONSES TO PHQ9 QUESTIONS 1 & 2: 0
SUM OF ALL RESPONSES TO PHQ QUESTIONS 1-9: 0

## 2025-02-10 ASSESSMENT — ENCOUNTER SYMPTOMS
APNEA: 0
CHEST TIGHTNESS: 0
ABDOMINAL DISTENTION: 1
CONSTIPATION: 1

## 2025-02-10 NOTE — PROGRESS NOTES
[facility]    History of Present Illness:   Treva Medina is a 44 y.o. female with history of Skin issues, Hormnal issues, Anxiety And depression, IBS   CC: Follow up  History provided by patient and Records    HPI:  Hormonal Issues:  Patient reports hat she is taking Birth control pills without  use of Sugar pill component.  Use of this with Spironolactone has helped with Skin issues.      IBS: Patient reports persistent issue, primarily Constipation, but has chronic pain overall.  Has intermittent periods of diarrhea.  Is wondering about trying Celexa like sister.    Anxiety:  Patient takes Buspar as needed for anxiety attacks.     Health Maintenance  Health Maintenance Due   Topic Date Due    Hepatitis C screen  Never done    Lipids  Never done    Breast cancer screen  Never done       Past Medical, Family, and Social History:     Current Outpatient Medications on File Prior to Visit   Medication Sig Dispense Refill    dicyclomine (BENTYL) 20 MG tablet Take 1 tablet by mouth 3 times daily as needed (Stomach pain) 120 tablet 1    busPIRone (BUSPAR) 10 MG tablet Take 1 tablet by mouth 3 times daily as needed (Anxiety) 90 tablet 1     No current facility-administered medications on file prior to visit.       Patient Active Problem List   Diagnosis    Mild dysplasia of cervix (NATE I)    Irritable bowel syndrome without diarrhea    Generalized anxiety disorder    Endocrine disorder, unspecified       Social History     Socioeconomic History    Marital status:      Spouse name: None    Number of children: None    Years of education: None    Highest education level: None   Tobacco Use    Smoking status: Never    Smokeless tobacco: Never   Vaping Use    Vaping status: Never Used   Substance and Sexual Activity    Alcohol use: No    Drug use: No    Sexual activity: Yes     Partners: Male     Birth control/protection: Surgical, Pill     Social Determinants of Health     Financial Resource

## 2025-02-10 NOTE — PROGRESS NOTES
\"Have you been to the ER, urgent care clinic since your last visit?  Hospitalized since your last visit?\"    no    “Have you seen or consulted any other health care providers outside our system since your last visit?”    no    Have you had a mammogram?”   due    No breast cancer screening on file

## 2025-02-11 LAB
ALBUMIN SERPL-MCNC: 3.7 G/DL (ref 3.5–5)
ALBUMIN/GLOB SERPL: 0.9 (ref 1.1–2.2)
ALP SERPL-CCNC: 72 U/L (ref 45–117)
ALT SERPL-CCNC: 14 U/L (ref 12–78)
ANION GAP SERPL CALC-SCNC: 6 MMOL/L (ref 2–12)
AST SERPL-CCNC: 10 U/L (ref 15–37)
BILIRUB SERPL-MCNC: 0.4 MG/DL (ref 0.2–1)
BUN SERPL-MCNC: 15 MG/DL (ref 6–20)
BUN/CREAT SERPL: 20 (ref 12–20)
CALCIUM SERPL-MCNC: 9.9 MG/DL (ref 8.5–10.1)
CHLORIDE SERPL-SCNC: 105 MMOL/L (ref 97–108)
CHOLEST SERPL-MCNC: 231 MG/DL
CO2 SERPL-SCNC: 27 MMOL/L (ref 21–32)
CREAT SERPL-MCNC: 0.75 MG/DL (ref 0.55–1.02)
ERYTHROCYTE [DISTWIDTH] IN BLOOD BY AUTOMATED COUNT: 12.1 % (ref 11.5–14.5)
GLOBULIN SER CALC-MCNC: 4.1 G/DL (ref 2–4)
GLUCOSE SERPL-MCNC: 91 MG/DL (ref 65–100)
HCT VFR BLD AUTO: 38.3 % (ref 35–47)
HCV AB SER IA-ACNC: 0.09 INDEX
HCV AB SERPL QL IA: NONREACTIVE
HDLC SERPL-MCNC: 56 MG/DL
HDLC SERPL: 4.1 (ref 0–5)
HGB BLD-MCNC: 12.6 G/DL (ref 11.5–16)
LDLC SERPL CALC-MCNC: 145 MG/DL (ref 0–100)
MCH RBC QN AUTO: 31.9 PG (ref 26–34)
MCHC RBC AUTO-ENTMCNC: 32.9 G/DL (ref 30–36.5)
MCV RBC AUTO: 97 FL (ref 80–99)
NRBC # BLD: 0 K/UL (ref 0–0.01)
NRBC BLD-RTO: 0 PER 100 WBC
PLATELET # BLD AUTO: 328 K/UL (ref 150–400)
PMV BLD AUTO: 9.9 FL (ref 8.9–12.9)
POTASSIUM SERPL-SCNC: 4.8 MMOL/L (ref 3.5–5.1)
PROT SERPL-MCNC: 7.8 G/DL (ref 6.4–8.2)
RBC # BLD AUTO: 3.95 M/UL (ref 3.8–5.2)
SODIUM SERPL-SCNC: 138 MMOL/L (ref 136–145)
TRIGL SERPL-MCNC: 150 MG/DL
VLDLC SERPL CALC-MCNC: 30 MG/DL
WBC # BLD AUTO: 6.7 K/UL (ref 3.6–11)

## 2025-03-10 DIAGNOSIS — K58.9 IRRITABLE BOWEL SYNDROME WITHOUT DIARRHEA: ICD-10-CM

## 2025-03-10 RX ORDER — CITALOPRAM HYDROBROMIDE 20 MG/1
20 TABLET ORAL DAILY
Qty: 90 TABLET | Refills: 1 | Status: SHIPPED | OUTPATIENT
Start: 2025-03-10

## 2025-06-18 DIAGNOSIS — N92.6 ABNORMAL MENSTRUAL PERIODS: ICD-10-CM

## 2025-06-18 RX ORDER — NORGESTIMATE AND ETHINYL ESTRADIOL 7DAYSX3 28
KIT ORAL
Qty: 84 TABLET | Refills: 1 | Status: SHIPPED | OUTPATIENT
Start: 2025-06-18

## 2025-08-24 DIAGNOSIS — K58.9 IRRITABLE BOWEL SYNDROME WITHOUT DIARRHEA: ICD-10-CM

## 2025-08-25 RX ORDER — CITALOPRAM HYDROBROMIDE 20 MG/1
20 TABLET ORAL DAILY
Qty: 90 TABLET | Refills: 0 | Status: SHIPPED | OUTPATIENT
Start: 2025-08-25

## 2025-08-28 ENCOUNTER — OFFICE VISIT (OUTPATIENT)
Facility: CLINIC | Age: 45
End: 2025-08-28
Payer: COMMERCIAL

## 2025-08-28 VITALS
OXYGEN SATURATION: 99 % | RESPIRATION RATE: 16 BRPM | BODY MASS INDEX: 23.85 KG/M2 | WEIGHT: 161 LBS | HEART RATE: 87 BPM | HEIGHT: 69 IN | TEMPERATURE: 97.9 F | SYSTOLIC BLOOD PRESSURE: 109 MMHG | DIASTOLIC BLOOD PRESSURE: 72 MMHG

## 2025-08-28 DIAGNOSIS — R09.81 CHRONIC NASAL CONGESTION: Primary | ICD-10-CM

## 2025-08-28 PROCEDURE — 99213 OFFICE O/P EST LOW 20 MIN: CPT
